# Patient Record
Sex: FEMALE | Race: BLACK OR AFRICAN AMERICAN | Employment: FULL TIME | URBAN - METROPOLITAN AREA
[De-identification: names, ages, dates, MRNs, and addresses within clinical notes are randomized per-mention and may not be internally consistent; named-entity substitution may affect disease eponyms.]

---

## 2017-02-09 RX ORDER — AMLODIPINE BESYLATE 10 MG/1
TABLET ORAL
Qty: 90 TAB | Refills: 0 | Status: SHIPPED | OUTPATIENT
Start: 2017-02-09 | End: 2017-05-08 | Stop reason: SDUPTHER

## 2017-02-22 RX ORDER — SCOLOPAMINE TRANSDERMAL SYSTEM 1 MG/1
1.5 PATCH, EXTENDED RELEASE TRANSDERMAL
Qty: 4 PATCH | Refills: 0 | Status: SHIPPED | OUTPATIENT
Start: 2017-02-22 | End: 2017-03-16 | Stop reason: ALTCHOICE

## 2017-02-22 NOTE — TELEPHONE ENCOUNTER
Patient calling to request a refill for her anti nausea medication [facility]. She will be leaving for a cruise on Sunday. Her contact # is [phone].

## 2017-03-16 ENCOUNTER — OFFICE VISIT (OUTPATIENT)
Dept: FAMILY MEDICINE CLINIC | Age: 57
End: 2017-03-16

## 2017-03-16 VITALS
RESPIRATION RATE: 16 BRPM | DIASTOLIC BLOOD PRESSURE: 81 MMHG | OXYGEN SATURATION: 97 % | TEMPERATURE: 98 F | SYSTOLIC BLOOD PRESSURE: 136 MMHG | BODY MASS INDEX: 31.64 KG/M2 | HEART RATE: 79 BPM | HEIGHT: 68 IN | WEIGHT: 208.8 LBS

## 2017-03-16 DIAGNOSIS — R29.898 TMJ CLICK: ICD-10-CM

## 2017-03-16 DIAGNOSIS — M26.629 TMJ PAIN DYSFUNCTION SYNDROME: Primary | ICD-10-CM

## 2017-03-16 RX ORDER — MECLIZINE HYDROCHLORIDE 25 MG/1
TABLET ORAL
Refills: 0 | COMMUNITY
Start: 2017-01-15 | End: 2020-09-28

## 2017-03-16 NOTE — PROGRESS NOTES
Chief Complaint   Patient presents with    Ear Pain     Feels stuffy with minimal pain started in Jan. 2017. Saw ENT 1/17 checked ears. Told to be on a soft diet for a week. 3/13/17 ate a cucumber and ear bothered her again. 3/14/17 saw the dentist and not sure if TMJ related.  Follow-up     [facility]. Had ears checked. Had a problem with dizziness. 1. Have you been to the ER, urgent care clinic since your last visit? Hospitalized since your last visit? Yes When: 1/15/17 [facility]: [facility] Reason for visit: Dizziness    2. Have you seen or consulted any other health care providers outside of the [address] [place] [address] since your last visit? Include any pap smears or colon screening. No       The patient was counseled on the dangers of tobacco use, and Patient is a non smoker. Reviewed strategies to maximize success, including Continue not to smoke. I have reviewed Health Maintenance with the patient and updated.   Advance Care Planning information reviewed and given to the patient at a previous vist.

## 2017-03-16 NOTE — MR AVS SNAPSHOT
Visit Information Date & Time Provider Department Dept. Phone Encounter #  
 3/16/2017  8:45 AM Douglas Dela Cruz MD [facility] [phone] [id number] Follow-up Instructions Return if symptoms worsen or fail to improve, for Has CHP scheduled. Follow-up and Disposition History Your Appointments 3/29/2017  8:15 AM  
LAB with LAB [facility] Mary Ann Aiken ([facility] [place]) Appt Note: Dr. Mireya Burton Fasting [address] [facility] [address]  
  
   
 [address]  
  
    
 4/5/2017  3:00 PM  
COMPLETE PHYSICAL with MD Mary Ann [facility]) Appt Note: Chp, Results [address] [facility] [address]  
[phone]  
  
   
 [address] [facility] [address] [facility] [address] [facility] Upcoming Health Maintenance Date Due DTaP/Tdap/Td series (1 - Tdap) 7/11/2018* BREAST CANCER SCRN MAMMOGRAM 8/1/2017 PAP AKA CERVICAL CYTOLOGY 7/9/2018 COLONOSCOPY 11/3/2021 *Topic was postponed. The date shown is not the original due date. Allergies as of 3/16/2017  Review Complete On: 3/16/2017 By: Douglas Dela Cruz MD  
  
 Severity Noted Reaction Type Reactions Contrast Agent [Iodine]  09/18/2009    Other (comments) Increased heart rate Current Immunizations  Reviewed on 9/15/2011 Name Date Influenza Vaccine Split 10/18/2010 TD Vaccine 7/11/2008, 10/1/1997 Not reviewed this visit You Were Diagnosed With   
  
 Codes Comments TMJ pain dysfunction syndrome    -  Primary ICD-10-CM: H53.520 ICD-9-CM: 524.69 TMJ click     JKG-10-VO: F65.115 ICD-9-CM: 524.64 Vitals BP Pulse Temp Resp Height(growth percentile) Weight(growth percentile) 136/81 (BP 1 Location: Left arm, BP Patient Position: Sitting) 79 98 °F (36.7 °C) (Oral) 16 5' 8.11\" (1.73 m) 208 lb 12.8 oz (94.7 kg) LMP SpO2 BMI OB Status Smoking Status 08/01/2016 97% 31.65 kg/m2 Premenopausal Never Smoker Vitals History BMI and BSA Data Body Mass Index Body Surface Area  
 31.65 kg/m 2 2.13 m 2 Preferred Pharmacy Pharmacy Name Phone [phone] [phone] Your Updated Medication List  
  
   
This list is accurate as of: 3/16/17  9:26 AM.  Always use your most recent med list. amLODIPine 10 mg tablet Commonly known as:  Lennis Eagles TAKE 1 TABLET DAILY  
  
 meclizine 25 mg tablet Commonly known as:  ANTIVERT  
TAKE 1 TABLET BY MOUTH 4 TIMES A DAY AS NEEDED FOR DIZZINESS NexIUM 20 mg capsule Generic drug:  esomeprazole Take  by mouth daily. VITAMIN D3 2,000 unit Tab Generic drug:  cholecalciferol (vitamin D3) Take 5,000 Units by mouth daily. Follow-up Instructions Return if symptoms worsen or fail to improve, for Has CHP scheduled. Introducing [facility]! Dear Olive Perez: Thank you for requesting a PurePhoto account. Our records indicate that you already have an active PurePhoto account. You can access your account anytime at [email or web address] Did you know that you can access your hospital and ER discharge instructions at any time in PurePhoto? You can also review all of your test results from your hospital stay or ER visit. Additional Information If you have questions, please visit the Frequently Asked Questions section of the PurePhoto website at [email or web address] Remember, PurePhoto is NOT to be used for urgent needs. For medical emergencies, dial 911. Now available from your iPhone and Android! Please provide this summary of care documentation to your next provider. Your primary care clinician is listed as [phone]  If you have any questions after today's visit, please call [phone].

## 2017-03-16 NOTE — PROGRESS NOTES
Nurse history read and confirmed by patient. Visit Vitals    /81 (BP 1 Location: Left arm, BP Patient Position: Sitting)    Pulse 79    Temp 98 °F (36.7 °C) (Oral)    Resp 16    Ht 5' 8.11\" (1.73 m)    Wt 208 lb 12.8 oz (94.7 kg)    LMP 08/01/2016    SpO2 97%    BMI 31.65 kg/m2       Patient alert and cooperative. Right canal, TM clear. Right TMJ with prominent click, though nontender to palpation. Assessment:  1. Right ear symptoms, probable TMJ related. Plan:  1. Go back on soft diet, use hot compresses, Advil, Aleve. 2. Follow up with the dentist for mouth guard. 3. Has CHP, labs scheduled. 4. Recheck here otherwise prn.

## 2017-03-29 ENCOUNTER — LAB ONLY (OUTPATIENT)
Dept: FAMILY MEDICINE CLINIC | Age: 57
End: 2017-03-29

## 2017-03-29 DIAGNOSIS — E78.00 PURE HYPERCHOLESTEROLEMIA: Primary | ICD-10-CM

## 2017-03-29 DIAGNOSIS — E55.9 VITAMIN D DEFICIENCY: ICD-10-CM

## 2017-03-29 DIAGNOSIS — I10 ESSENTIAL HYPERTENSION: ICD-10-CM

## 2017-03-29 NOTE — LETTER
3/30/2017 4:17 PM 
 
Ms. Karie Marks [address] [place] [address] Dear Laura Tariq: 
 
Please find your most recent results below. Resulted Orders VITAMIN D, 25 HYDROXY Result Value Ref Range VITAMIN D, 25-HYDROXY 35.0 30.0 - 100.0 ng/mL Comment:  
   Vitamin D deficiency has been defined by the [address] [place] [address] practice guideline as a 
level of serum 25-OH vitamin D less than 20 ng/mL (1,2). The Endocrine Society went on to further define vitamin D 
insufficiency as a level between 21 and 29 ng/mL (2). 1. IOM ([place] of Medicine). 2010. Dietary reference 
   intakes for calcium and D. [facility]: The 
   SafetyWeb. 2. Dora MF, Owen VERAS, Cody CHAVEZ, et al. 
   Evaluation, treatment, and prevention of vitamin D 
   deficiency: an Endocrine Society clinical practice 
   guideline. JCEM. 2011 Jul; 96(7):1911-30. Narrative Performed at:  [address]  [id number] : Pratibha Fong MD, Phone:  [phone] CBC WITH AUTOMATED DIFF Result Value Ref Range WBC 4.5 3.4 - 10.8 x10E3/uL  
 RBC 5.05 3.77 - 5.28 x10E6/uL HGB 13.5 11.1 - 15.9 g/dL HCT 41.8 34.0 - 46.6 % MCV 83 79 - 97 fL  
 MCH 26.7 26.6 - 33.0 pg  
 MCHC 32.3 31.5 - 35.7 g/dL  
 RDW 13.8 12.3 - 15.4 % PLATELET 099 274 - 031 x10E3/uL NEUTROPHILS 53 % Lymphocytes 33 % MONOCYTES 9 % EOSINOPHILS 5 % BASOPHILS 0 %  
 ABS. NEUTROPHILS 2.4 1.4 - 7.0 x10E3/uL Abs Lymphocytes 1.5 0.7 - 3.1 x10E3/uL  
 ABS. MONOCYTES 0.4 0.1 - 0.9 x10E3/uL  
 ABS. EOSINOPHILS 0.2 0.0 - 0.4 x10E3/uL  
 ABS. BASOPHILS 0.0 0.0 - 0.2 x10E3/uL IMMATURE GRANULOCYTES 0 %  
 ABS. IMM. GRANS. 0.0 0.0 - 0.1 x10E3/uL Narrative Performed at:  [address]  [id number] : Pratibha Fong MD, Phone:  [phone] URINALYSIS W/ RFLX MICROSCOPIC Result Value Ref Range Specific Gravity 1.026 1.005 - 1.030  
 pH (UA) 6.0 5.0 - 7.5 Color Yellow Yellow Appearance Cloudy (A) Clear Leukocyte Esterase Negative Negative Protein Trace Negative/Trace Glucose Negative Negative Ketone Negative Negative Blood Negative Negative Bilirubin Negative Negative Urobilinogen 0.2 0.2 - 1.0 mg/dL Nitrites Negative Negative Microscopic Examination Comment Comment:  
   Microscopic not indicated and not performed. Narrative Performed at:  [address]  [id number] : Sherlyn Jones MD, Phone:  [phone] TSH 3RD GENERATION Result Value Ref Range TSH 1.380 0.450 - 4.500 uIU/mL Narrative Performed at:  [address]  [id number] : Sherlyn Jones MD, Phone:  [phone] METABOLIC PANEL, COMPREHENSIVE Result Value Ref Range Glucose 89 65 - 99 mg/dL BUN 11 6 - 24 mg/dL Creatinine 0.83 0.57 - 1.00 mg/dL GFR est non-AA 79 >59 mL/min/1.73 GFR est AA 91 >59 mL/min/1.73  
 BUN/Creatinine ratio 13 9 - 23 Comment: **Effective April 3, 2017 BUN/Creatinine Ratio** 
  reference interval will be changing to: Age                  Male          Female 
     0 days   -  7 days          9 - 25         9 - 26 
     8 days   - 30 days          8 - 28        10 - 33 
     1 month  -  6 months       11 - 62        11 - 47 
     7 months -  1 year         20 - 70        20 - 70 
     2 years  -  5 years        23 - 52        20 - 52 
     6 years  - 15 years        15 - 29        12 - 28 
    13 years  - 16 years        8 - 25        10 - 25 
    18 years  - 61 years         5 - 21         9 - 21 
               >59 years        10 - 25        15 - 29 Sodium 141 134 - 144 mmol/L Potassium 4.3 3.5 - 5.2 mmol/L  Chloride 102 96 - 106 mmol/L  
 CO2 24 18 - 29 mmol/L Calcium 9.6 8.7 - 10.2 mg/dL Protein, total 7.0 6.0 - 8.5 g/dL Albumin 4.3 3.5 - 5.5 g/dL GLOBULIN, TOTAL 2.7 1.5 - 4.5 g/dL A-G Ratio 1.6 1.2 - 2.2 Comment: **Please note reference interval change** Bilirubin, total 0.4 0.0 - 1.2 mg/dL Alk. phosphatase 73 39 - 117 IU/L  
 AST (SGOT) 15 0 - 40 IU/L  
 ALT (SGPT) 8 0 - 32 IU/L Narrative Performed at:  [address]  [id number] : Sudha Arreguin MD, Phone:  [phone] LIPID PANEL Result Value Ref Range Cholesterol, total 261 (H) 100 - 199 mg/dL Triglyceride 62 0 - 149 mg/dL HDL Cholesterol 58 >39 mg/dL VLDL, calculated 12 5 - 40 mg/dL LDL, calculated 191 (H) 0 - 99 mg/dL Narrative Performed at:  [address]  [id number] : Sudha Arreguin MD, Phone:  [phone] CVD REPORT Result Value Ref Range INTERPRETATION Note Comment:  
   Supplement report is available. Narrative Performed at:  [address] [place] [address] [address] [place] [address]  [id number] : Nette Forrest PhD, Phone:  [phone] Sincerely, Lab [facility]

## 2017-03-30 LAB
25(OH)D3+25(OH)D2 SERPL-MCNC: 35 NG/ML (ref 30–100)
ALBUMIN SERPL-MCNC: 4.3 G/DL (ref 3.5–5.5)
ALBUMIN/GLOB SERPL: 1.6 {RATIO} (ref 1.2–2.2)
ALP SERPL-CCNC: 73 IU/L (ref 39–117)
ALT SERPL-CCNC: 8 IU/L (ref 0–32)
APPEARANCE UR: ABNORMAL
AST SERPL-CCNC: 15 IU/L (ref 0–40)
BASOPHILS # BLD AUTO: 0 X10E3/UL (ref 0–0.2)
BASOPHILS NFR BLD AUTO: 0 %
BILIRUB SERPL-MCNC: 0.4 MG/DL (ref 0–1.2)
BILIRUB UR QL STRIP: NEGATIVE
BUN SERPL-MCNC: 11 MG/DL (ref 6–24)
BUN/CREAT SERPL: 13 (ref 9–23)
CALCIUM SERPL-MCNC: 9.6 MG/DL (ref 8.7–10.2)
CHLORIDE SERPL-SCNC: 102 MMOL/L (ref 96–106)
CHOLEST SERPL-MCNC: 261 MG/DL (ref 100–199)
CO2 SERPL-SCNC: 24 MMOL/L (ref 18–29)
COLOR UR: YELLOW
CREAT SERPL-MCNC: 0.83 MG/DL (ref 0.57–1)
EOSINOPHIL # BLD AUTO: 0.2 X10E3/UL (ref 0–0.4)
EOSINOPHIL NFR BLD AUTO: 5 %
ERYTHROCYTE [DISTWIDTH] IN BLOOD BY AUTOMATED COUNT: 13.8 % (ref 12.3–15.4)
GLOBULIN SER CALC-MCNC: 2.7 G/DL (ref 1.5–4.5)
GLUCOSE SERPL-MCNC: 89 MG/DL (ref 65–99)
GLUCOSE UR QL: NEGATIVE
HCT VFR BLD AUTO: 41.8 % (ref 34–46.6)
HDLC SERPL-MCNC: 58 MG/DL
HGB BLD-MCNC: 13.5 G/DL (ref 11.1–15.9)
HGB UR QL STRIP: NEGATIVE
IMM GRANULOCYTES # BLD: 0 X10E3/UL (ref 0–0.1)
IMM GRANULOCYTES NFR BLD: 0 %
INTERPRETATION, 910389: NORMAL
KETONES UR QL STRIP: NEGATIVE
LDLC SERPL CALC-MCNC: 191 MG/DL (ref 0–99)
LEUKOCYTE ESTERASE UR QL STRIP: NEGATIVE
LYMPHOCYTES # BLD AUTO: 1.5 X10E3/UL (ref 0.7–3.1)
LYMPHOCYTES NFR BLD AUTO: 33 %
MCH RBC QN AUTO: 26.7 PG (ref 26.6–33)
MCHC RBC AUTO-ENTMCNC: 32.3 G/DL (ref 31.5–35.7)
MCV RBC AUTO: 83 FL (ref 79–97)
MICRO URNS: ABNORMAL
MONOCYTES # BLD AUTO: 0.4 X10E3/UL (ref 0.1–0.9)
MONOCYTES NFR BLD AUTO: 9 %
NEUTROPHILS # BLD AUTO: 2.4 X10E3/UL (ref 1.4–7)
NEUTROPHILS NFR BLD AUTO: 53 %
NITRITE UR QL STRIP: NEGATIVE
PH UR STRIP: 6 [PH] (ref 5–7.5)
PLATELET # BLD AUTO: 215 X10E3/UL (ref 150–379)
POTASSIUM SERPL-SCNC: 4.3 MMOL/L (ref 3.5–5.2)
PROT SERPL-MCNC: 7 G/DL (ref 6–8.5)
PROT UR QL STRIP: ABNORMAL
RBC # BLD AUTO: 5.05 X10E6/UL (ref 3.77–5.28)
SODIUM SERPL-SCNC: 141 MMOL/L (ref 134–144)
SP GR UR: 1.03 (ref 1–1.03)
TRIGL SERPL-MCNC: 62 MG/DL (ref 0–149)
TSH SERPL DL<=0.005 MIU/L-ACNC: 1.38 UIU/ML (ref 0.45–4.5)
UROBILINOGEN UR STRIP-MCNC: 0.2 MG/DL (ref 0.2–1)
VLDLC SERPL CALC-MCNC: 12 MG/DL (ref 5–40)
WBC # BLD AUTO: 4.5 X10E3/UL (ref 3.4–10.8)

## 2017-03-30 NOTE — PROGRESS NOTES
Lipids worse. Prev 0 score heart scan 2014. Still recommend low dose statin to get LDL at least < 160.   Rest labs all O.K.

## 2017-04-05 ENCOUNTER — OFFICE VISIT (OUTPATIENT)
Dept: FAMILY MEDICINE CLINIC | Age: 57
End: 2017-04-05

## 2017-04-05 VITALS
HEIGHT: 68 IN | SYSTOLIC BLOOD PRESSURE: 140 MMHG | HEART RATE: 89 BPM | TEMPERATURE: 97.7 F | WEIGHT: 207.7 LBS | OXYGEN SATURATION: 98 % | DIASTOLIC BLOOD PRESSURE: 88 MMHG | BODY MASS INDEX: 31.48 KG/M2 | RESPIRATION RATE: 16 BRPM

## 2017-04-05 DIAGNOSIS — K63.5 POLYP OF COLON, UNSPECIFIED PART OF COLON, UNSPECIFIED TYPE: ICD-10-CM

## 2017-04-05 DIAGNOSIS — K21.9 GASTROESOPHAGEAL REFLUX DISEASE WITHOUT ESOPHAGITIS: ICD-10-CM

## 2017-04-05 DIAGNOSIS — Z83.71 FH: COLONIC POLYPS: ICD-10-CM

## 2017-04-05 DIAGNOSIS — E78.00 PURE HYPERCHOLESTEROLEMIA: ICD-10-CM

## 2017-04-05 DIAGNOSIS — I10 ESSENTIAL HYPERTENSION: ICD-10-CM

## 2017-04-05 DIAGNOSIS — E55.9 VITAMIN D DEFICIENCY: ICD-10-CM

## 2017-04-05 DIAGNOSIS — Z00.00 PHYSICAL EXAM: Primary | ICD-10-CM

## 2017-04-05 RX ORDER — NAPROXEN SODIUM 550 MG/1
TABLET ORAL
COMMUNITY
Start: 2017-04-04 | End: 2017-04-05 | Stop reason: ALTCHOICE

## 2017-04-05 RX ORDER — LANOLIN ALCOHOL/MO/W.PET/CERES
CREAM (GRAM) TOPICAL
COMMUNITY
End: 2018-07-26 | Stop reason: ALTCHOICE

## 2017-04-05 RX ORDER — PRAVASTATIN SODIUM 20 MG/1
20 TABLET ORAL
Qty: 30 TAB | Refills: 0 | Status: SHIPPED | OUTPATIENT
Start: 2017-04-05 | End: 2018-07-26 | Stop reason: ALTCHOICE

## 2017-04-05 NOTE — PROGRESS NOTES
HISTORY OF PRESENT ILLNESS  Nino Fernando is a 65 y.o. female. HPI   Here for [facility]. TMJ improving. Getting mouth guard and plans on doing PT. Willing to start statin therapy. FH neg for heart disease. Eye doctor past yr. Dentist 2 x annually. Colonoscopy '16. Repeat 5 yr for polyps. Gyn and mammo UTD. No signif hx past yr. Patient Active Problem List   Diagnosis Code    Pure hypercholesterolemia E78.00    Essential hypertension I10    GERD (gastroesophageal reflux disease) K21.9    Vitamin D deficiency E55.9    FH: colonic polyps Z83.71     Current Outpatient Prescriptions   Medication Sig Dispense Refill    omega 3-dha-epa-fish oil (FISH OIL) 100-160-1,000 mg cap Take 5 mL by mouth daily.  OTHER Meratrim 800 mg  Taking 1 daily.  ferrous sulfate (IRON) 325 mg (65 mg iron) tablet Take  by mouth Daily (before breakfast).  meclizine (ANTIVERT) 25 mg tablet TAKE 1 TABLET BY MOUTH 4 TIMES A DAY AS NEEDED FOR DIZZINESS  0    amLODIPine (NORVASC) 10 mg tablet TAKE 1 TABLET DAILY 90 Tab 0    esomeprazole (NEXIUM) 20 mg capsule Take  by mouth daily.  cholecalciferol, vitamin D3, (VITAMIN D3) 2,000 unit tab Take 5,000 Units by mouth daily.        Allergies   Allergen Reactions    Contrast Agent [Iodine] Other (comments)     Increased heart rate     Past Medical History:   Diagnosis Date    Advance directive discussed with patient 03/15/2016    Anemia NEC     shwetha romero   2/11/16 f/u note    Atypical chest pain     dr Stevo Faria [phone]    Back pain 03/2016    Colon polyp 11/03/2016    bx pending but report says 5 yr repeat    Dysphagia 2/24/14    notes from [address] EGD planned    Elevated lipids     dr Austyn Valdez [phone]    Eustachian tube dysfunction 01/10/2017    notes from ENT giodano    Fibrocystic breast disease     Fractured tarsal bone 10/2013    pt first    GERD (gastroesophageal reflux disease)     Head ache 2/9/15    tension headache per [facility] note    Headache(784.0) 11/2009    dr Jackie Velásquez Hypercholesterolemia     Hypertension     Incontinence of urine     Labyrinthitis 01/13/2017    PF Norberto note    Left knee pain 7/3013    6/4/14 [facility]    PPD positive, treated     INH but not sure length of tx    Screening for viral disease 12/3/15    titers neg for Hep A-B and C thru GYN    Tension type headache     Viral gastroenteritis [id number]    kilo notes     Past Surgical History:   Procedure Laterality Date    CT HEART W/O CONT WITH CALCIUM  3/2006    score 4.81    EGD  3/2002    dr Stevens for Barretts    HX COLONOSCOPY  11/03/2016    5 yr recall-1 polyp removed    HX ORTHOPAEDIC  9/2010    torn meniscus dr aguero    HX ORTHOPAEDIC  1/18/10    ganglion cyst dr Breanna Linares    [address]  3/17/14    repeat EGD Dr Diaz Selby FLX DX W/COLLJ [address] PFRMD  10/3/2011          Family History   Problem Relation Age of Onset    Cancer Father      pancreatic    Cancer Maternal Aunt      Social History   Substance Use Topics    Smoking status: Never Smoker    Smokeless tobacco: Never Used    Alcohol use Yes      Comment: wine occassional      Lab Results  Component Value Date/Time   WBC 4.5 03/29/2017 08:20 AM   HGB 13.5 03/29/2017 08:20 AM   HCT 41.8 03/29/2017 08:20 AM   PLATELET 140 66/33/2266 08:20 AM   MCV 83 03/29/2017 08:20 AM       Lab Results  Component Value Date/Time   Glucose 89 03/29/2017 08:20 AM   LDL, calculated 191 03/29/2017 08:20 AM   Creatinine 0.83 03/29/2017 08:20 AM      Lab Results  Component Value Date/Time   Cholesterol, total 261 03/29/2017 08:20 AM   HDL Cholesterol 58 03/29/2017 08:20 AM   LDL, calculated 191 03/29/2017 08:20 AM   Triglyceride 62 03/29/2017 08:20 AM   CHOL/HDL Ratio 3.7 11/11/2010 10:33 AM       Lab Results  Component Value Date/Time   ALT (SGPT) 8 03/29/2017 08:20 AM   AST (SGOT) 15 03/29/2017 08:20 AM   Alk.  phosphatase 73 03/29/2017 08:20 AM   Bilirubin, direct 0.10 11/22/2011 08:45 AM   Bilirubin, total 0.4 03/29/2017 08:20 AM       Lab Results  Component Value Date/Time   GFR est AA 91 03/29/2017 08:20 AM   GFR est non-AA 79 03/29/2017 08:20 AM   Creatinine 0.83 03/29/2017 08:20 AM   BUN 11 03/29/2017 08:20 AM   Sodium 141 03/29/2017 08:20 AM   Potassium 4.3 03/29/2017 08:20 AM   Chloride 102 03/29/2017 08:20 AM   CO2 24 03/29/2017 08:20 AM      Lab Results  Component Value Date/Time   TSH 1.380 03/29/2017 08:20 AM      Lab Results   Component Value Date/Time    Glucose 89 03/29/2017 08:20 AM         Review of Systems   Constitutional: Positive for weight loss. HENT: Positive for congestion, ear pain and hearing loss. Eyes: Negative. Respiratory: Negative. Cardiovascular: Negative. Gastrointestinal: Positive for heartburn. Genitourinary: Negative. Musculoskeletal: Positive for back pain and joint pain. Skin: Negative. Neurological: Positive for dizziness and tingling. Endo/Heme/Allergies: Positive for environmental allergies. Psychiatric/Behavioral: The patient has insomnia. Physical Exam   Vitals:    04/05/17 1513 04/05/17 1526   BP: 135/89 140/88   Pulse: 89    Resp: 16    Temp: 97.7 °F (36.5 °C)    TempSrc: Oral    SpO2: 98%    Weight: 207 lb 11.2 oz (94.2 kg)    Height: 5' 8\" (1.727 m)        General    alert, cooperative, no distress, appears stated age   Head    Normocephalic, without obvious abnormality, atraumatic   Eyes    conjunctivae/corneas clear. PERRL. Fundi benign   Ears    Canals and TMs clear   Nose Passages patent. Mucosa normal. No drainage or sinus tenderness. Throat Lips, mucosa, and tongue normal. Teeth and gums normal.  Post pharynx neg. Neck supple, nontender, no adenopathy, thyroid: not enlarged, no masses/nodules, no carotid bruits   Back     symmetric, no curvature. FROM.  No CVA tenderness   Lungs     clear to auscultation bilaterally   Breasts    Declined   Heart    Regular rate and rhythm, with no murmur, click, rub or gallop   Abdomen   Soft, non-tender. Bowel sounds normal. No masses,  No organomegaly   Genitalia and Rectal  Deferred. Per GYN. Extremities   extremities normal, atraumatic, no cyanosis or edema   Pulses   2+ and symmetric except absent left radial from prev surgery. Skin No rashes or lesions       Neurologic Romberg neg, nml heel, toe and Tandem gait. DTRs 2+ symmetric except absent left patella. ASSESSMENT and PLAN    ICD-10-CM ICD-9-CM    1. Physical exam Z00.00 V70.9    2. Polyp of colon, unspecified part of colon, unspecified type K63.5 211.3    3. Pure hypercholesterolemia E78.00 272.0    4. Essential hypertension I10 401.9    5. Gastroesophageal reflux disease without esophagitis K21.9 530.81    6. Vitamin D deficiency E55.9 268.9    7. FH: colonic polyps Z83.71 V18.51      Follow-up Disposition:  Return in about 1 year (around 4/5/2018) for annual CHP and fasting labs.

## 2017-04-05 NOTE — PROGRESS NOTES
Chief Complaint   Patient presents with    Complete Physical     Patient has questions about Oil of Oregano,  Olive leaf extract and Astralagus. 1. Have you been to the ER, urgent care clinic since your last visit? Hospitalized since your last visit? No    2. Have you seen or consulted any other health care providers outside of the [address] [place] [address] since your last visit? Include any pap smears or colon screening. No     The patient was counseled on the dangers of tobacco use, and Patient is a non smoker. .  Reviewed strategies to maximize success, including Continue not to smoke. I have reviewed Health Maintenance with the patient and updated. Advance Care Planning information reviewed and given to the patient at a previous visit. Complete Physical Exam Female  Pre-Visit Questions:    1. Do you follow a low fat or low salt diet ? y  2. Do you follow an exercise program? y  3. Have you had your tetanus booster in the last 10 years? y  3. Have you ever had a Pneumonia vaccine? n  5. Do you smoke? n  6. Do you consider yourself overweight? y  7. Do you perform Breast self exam?n  8. Is there a family history of CAD< age 48? n  5. Is there a family history of Cancer? y  8. Do you have any Cancer risks? y  Mother's aunt had Breast cancer. 11.  Have you had a colonoscopy? y  15. Have you been to your eye doctor past year?   y  15. Have you been to your dentist in the last 6 months?  y  15. Have you had your flu shot for this season?  n  13. Have you had a Pap smear in the last 3 years?y  16. Have you had your annual mammogram?y  17.   Have you had a bone density scan(DEXA)?n

## 2017-04-17 ENCOUNTER — OFFICE VISIT (OUTPATIENT)
Dept: CARDIOLOGY CLINIC | Age: 57
End: 2017-04-17

## 2017-04-17 VITALS
HEART RATE: 75 BPM | RESPIRATION RATE: 18 BRPM | HEIGHT: 68 IN | BODY MASS INDEX: 31.04 KG/M2 | OXYGEN SATURATION: 97 % | SYSTOLIC BLOOD PRESSURE: 120 MMHG | DIASTOLIC BLOOD PRESSURE: 80 MMHG | WEIGHT: 204.8 LBS

## 2017-04-17 DIAGNOSIS — I10 ESSENTIAL HYPERTENSION: ICD-10-CM

## 2017-04-17 DIAGNOSIS — E78.2 MIXED HYPERLIPIDEMIA: Primary | ICD-10-CM

## 2017-04-17 DIAGNOSIS — R93.1 AGATSTON CAC SCORE, <100: ICD-10-CM

## 2017-04-17 RX ORDER — CETIRIZINE HYDROCHLORIDE 5 MG/1
TABLET ORAL
COMMUNITY
End: 2018-07-26 | Stop reason: ALTCHOICE

## 2017-04-17 RX ORDER — BENZOCAINE .13; .15; .5; 2 G/100G; G/100G; G/100G; G/100G
GEL ORAL
COMMUNITY
End: 2018-07-26 | Stop reason: ALTCHOICE

## 2017-04-17 NOTE — MR AVS SNAPSHOT
Visit Information Date & Time Provider Department Dept. Phone Encounter #  
 4/17/2017  3:00 PM Aris Burger, [address] [facility] [id number] Upcoming Health Maintenance Date Due  
 BREAST CANCER SCRN MAMMOGRAM 8/1/2017 DTaP/Tdap/Td series (1 - Tdap) 7/11/2018* PAP AKA CERVICAL CYTOLOGY 7/9/2018 COLONOSCOPY 11/3/2021 *Topic was postponed. The date shown is not the original due date. Allergies as of 4/17/2017  Review Complete On: 4/17/2017 By: Arsi Burger MD  
  
 Severity Noted Reaction Type Reactions Contrast Agent [Iodine]  09/18/2009    Other (comments) Increased heart rate Current Immunizations  Reviewed on 9/15/2011 Name Date Influenza Vaccine Split 10/18/2010 TD Vaccine 7/11/2008, 10/1/1997 Not reviewed this visit You Were Diagnosed With   
  
 Codes Comments Mixed hyperlipidemia    -  Primary ICD-10-CM: M93.9 ICD-9-CM: 272.2 Essential hypertension     ICD-10-CM: I10 
ICD-9-CM: 401.9 [place] CAC score, <100     ICD-10-CM: R93.1 ICD-9-CM: 793.2 Vitals BP Pulse Resp Height(growth percentile) Weight(growth percentile) SpO2  
 120/80 (BP 1 Location: Right arm, BP Patient Position: Sitting) 75 18 5' 8\" (1.727 m) 204 lb 12.8 oz (92.9 kg) 97% BMI OB Status Smoking Status 31.14 kg/m2 Premenopausal Never Smoker Vitals History BMI and BSA Data Body Mass Index Body Surface Area  
 31.14 kg/m 2 2.11 m 2 Preferred Pharmacy Pharmacy Name Phone [phone] [facility] [phone] Your Updated Medication List  
  
   
This list is accurate as of: 4/17/17  3:48 PM.  Always use your most recent med list. amLODIPine 10 mg tablet Commonly known as:  Leana Fahad TAKE 1 TABLET DAILY  
  
 cetirizine 5 mg tablet Commonly known as:  ZYRTEC Take  by mouth. FISH -160-1,000 mg Cap Generic drug:  omega 3-dha-epa-fish oil Take 5 mL by mouth daily. Iron 325 mg (65 mg iron) tablet Generic drug:  ferrous sulfate Take  by mouth Daily (before breakfast). meclizine 25 mg tablet Commonly known as:  ANTIVERT  
TAKE 1 TABLET BY MOUTH 4 TIMES A DAY AS NEEDED FOR DIZZINESS NexIUM 20 mg capsule Generic drug:  esomeprazole Take  by mouth daily. OTHER Meratrim 800 mg  Taking 1 daily. pravastatin 20 mg tablet Commonly known as:  PRAVACHOL Take 1 Tab by mouth nightly. Indications: primary prevention of coronary heart disease RHINOCORT AQUA 32 mcg/actuation nasal spray Generic drug:  budesonide VITAMIN D3 2,000 unit Tab Generic drug:  cholecalciferol (vitamin D3) Take 5,000 Units by mouth daily. We Performed the Following AMB POC EKG ROUTINE W/ 12 LEADS, INTER & REP [85795 CPT(R)] Introducing [facility]! Dear Joshua So: Thank you for requesting a Mavent account. Our records indicate that you already have an active Mavent account. You can access your account anytime at [email or web address]. [email or web address] in One Medical Did you know that you can access your hospital and ER discharge instructions at any time in Mavent? You can also review all of your test results from your hospital stay or ER visit. Additional Information If you have questions, please visit the Frequently Asked Questions section of the Mavent website at [email or web address] Remember, Mavent is NOT to be used for urgent needs. For medical emergencies, dial 911. Now available from your iPhone and Android! Please provide this summary of care documentation to your next provider. Your primary care clinician is listed as [phone] If you have any questions after today's visit, please call [phone].

## 2017-04-17 NOTE — PROGRESS NOTES
[address], [address] [facility]  [phone]     Subjective:      Jose Miranda is a 65 y.o. female is here for routine f/u. The patient denies chest pain/ shortness of breath, orthopnea, PND, LE edema, palpitations, syncope, or presyncope.        Patient Active Problem List    Diagnosis Date Noted    Agatston CAC score, <100 04/17/2017    FH: colonic polyps 03/15/2016    Vitamin D deficiency 09/13/2011    Mixed hyperlipidemia 09/18/2009    Essential hypertension 09/18/2009    GERD (gastroesophageal reflux disease) 09/18/2009      Leighann Muller MD  Past Medical History:   Diagnosis Date    Advance directive discussed with patient 03/15/2016    Anemia NEC     shwetha philipan   2/11/16 f/u note    Atypical chest pain     dr Alicia Rucker [phone]    Back pain 03/2016    Colon polyp 11/03/2016    bx pending but report says 5 yr repeat    Dysphagia 2/24/14    notes from [facility] EGD planned    Elevated lipids     dr Yamini Jensen [phone]    Eustachian tube dysfunction 01/10/2017    notes from ENT giodano    Fibrocystic breast disease     Fractured tarsal bone 10/2013    pt first    GERD (gastroesophageal reflux disease)     Head ache 2/9/15    tension headache per Pt First note    Headache 11/2009    dr [facility] [place] Hypercholesterolemia     Hypertension     Incontinence of urine     Labyrinthitis 01/13/2017    [facility]to note    Left knee pain 7/3013    6/4/14 [facility]    PPD positive, treated     INH but not sure length of tx    Screening for viral disease 12/3/15    titers neg for Hep A-B and C thru GYN    Tension type headache     Viral gastroenteritis [id number]    kilo notes      Past Surgical History:   Procedure Laterality Date    CT HEART W/O CONT WITH CALCIUM  3/2006    score 4.81    EGD  3/2002    dr Raz Gutierrez for Barretts    HX COLONOSCOPY  11/03/2016    5 yr recall-1 polyp removed    HX ORTHOPAEDIC  9/2010 torn meniscus dr aguero    HX ORTHOPAEDIC  1/18/10    ganglion cyst dr Maggie Merida OTHER SURGICAL  3/17/14    repeat EGD Dr Farooq Joaquin FLX DX W/COLLJ [place] 1977 PFRMD  10/3/2011          Allergies   Allergen Reactions    Contrast Agent [Iodine] Other (comments)     Increased heart rate      Family History   Problem Relation Age of Onset    Cancer Father      pancreatic    Cancer Maternal Aunt       Social History     Social History    Marital status:      Spouse name: N/A    Number of children: N/A    Years of education: N/A     Occupational History    Not on file. Social History Main Topics    Smoking status: Never Smoker    Smokeless tobacco: Never Used    Alcohol use Yes      Comment: wine occassional    Drug use: No    Sexual activity: Not on file     Other Topics Concern    Not on file     Social History Narrative      Current Outpatient Prescriptions   Medication Sig    budesonide (RHINOCORT AQUA) 32 mcg/actuation nasal spray     cetirizine (ZYRTEC) 5 mg tablet Take  by mouth.  omega 3-dha-epa-fish oil (FISH OIL) 100-160-1,000 mg cap Take 5 mL by mouth daily.  OTHER Meratrim 800 mg  Taking 1 daily.  ferrous sulfate (IRON) 325 mg (65 mg iron) tablet Take  by mouth Daily (before breakfast).  pravastatin (PRAVACHOL) 20 mg tablet Take 1 Tab by mouth nightly. Indications: primary prevention of coronary heart disease    meclizine (ANTIVERT) 25 mg tablet TAKE 1 TABLET BY MOUTH 4 TIMES A DAY AS NEEDED FOR DIZZINESS    amLODIPine (NORVASC) 10 mg tablet TAKE 1 TABLET DAILY    esomeprazole (NEXIUM) 20 mg capsule Take  by mouth daily.  cholecalciferol, vitamin D3, (VITAMIN D3) 2,000 unit tab Take 5,000 Units by mouth daily. No current facility-administered medications for this visit.           Review of Symptoms:  11 systems reviewed, negative other than as stated in the HPI    Physical ExamPhysical Exam:    Vitals:    04/17/17 1502 04/17/17 1517   BP: 122/80 120/80   Pulse: 75    Resp: 18    SpO2: 97%    Weight: 204 lb 12.8 oz (92.9 kg)    Height: 5' 8\" (1.727 m)      Body mass index is 31.14 kg/(m^2). General PE   Gen:  NAD  Mental Status - Alert. General Appearance - Not in acute distress. Chest and Lung Exam   Inspection: Accessory muscles - No use of accessory muscles in breathing. Auscultation:   Breath sounds: - Normal.   Cardiovascular   Inspection: Jugular vein - Bilateral - Inspection Normal.   Palpation/Percussion:   Apical Impulse: - Normal.   Auscultation: Rhythm - Regular. Heart Sounds - S1 WNL and S2 WNL. No S3 or S4. Murmurs & Other Heart Sounds: Auscultation of the heart reveals - No Murmurs. Peripheral Vascular   Upper Extremity: Inspection - Bilateral - No Cyanotic nailbeds or Digital clubbing. Lower Extremity:   Palpation: Edema - Bilateral - No edema. Abdomen:   Soft, non-tender, bowel sounds are active.   Neuro: A&O times 3, CN and motor grossly WNL    Labs:   Lab Results   Component Value Date/Time    Cholesterol, total 261 03/29/2017 08:20 AM    Cholesterol, total 242 03/15/2016 10:36 AM    Cholesterol, total 222 08/21/2014 08:10 AM    Cholesterol, total 214 04/10/2013 09:35 AM    Cholesterol, total 227 11/22/2011 08:45 AM    HDL Cholesterol 58 03/29/2017 08:20 AM    HDL Cholesterol 66 03/15/2016 10:36 AM    HDL Cholesterol 69 08/21/2014 08:10 AM    HDL Cholesterol 74 04/10/2013 09:35 AM    HDL Cholesterol 76 11/22/2011 08:45 AM    LDL, calculated 191 03/29/2017 08:20 AM    LDL, calculated 166 03/15/2016 10:36 AM    LDL, calculated 144 08/21/2014 08:10 AM    LDL, calculated 133 04/10/2013 09:35 AM    LDL, calculated 141 11/22/2011 08:45 AM    Triglyceride 62 03/29/2017 08:20 AM    Triglyceride 49 03/15/2016 10:36 AM    Triglyceride 47 08/21/2014 08:10 AM    Triglyceride 35 04/10/2013 09:35 AM    Triglyceride 51 11/22/2011 08:45 AM    CHOL/HDL Ratio 3.7 11/11/2010 10:33 AM    CHOL/HDL Ratio 3.5 10/15/2009 07:53 AM     Lab Results Component Value Date/Time     03/08/2012 10:12 AM     Lab Results   Component Value Date/Time    Sodium 141 03/29/2017 08:20 AM    Potassium 4.3 03/29/2017 08:20 AM    Chloride 102 03/29/2017 08:20 AM    CO2 24 03/29/2017 08:20 AM    Anion gap 6 03/08/2012 04:45 AM    Glucose 89 03/29/2017 08:20 AM    BUN 11 03/29/2017 08:20 AM    Creatinine 0.83 03/29/2017 08:20 AM    BUN/Creatinine ratio 13 03/29/2017 08:20 AM    GFR est AA 91 03/29/2017 08:20 AM    GFR est non-AA 79 03/29/2017 08:20 AM    Calcium 9.6 03/29/2017 08:20 AM    Bilirubin, total 0.4 03/29/2017 08:20 AM    AST (SGOT) 15 03/29/2017 08:20 AM    Alk. phosphatase 73 03/29/2017 08:20 AM    Protein, total 7.0 03/29/2017 08:20 AM    Albumin 4.3 03/29/2017 08:20 AM    Globulin 3.8 03/08/2012 04:45 AM    A-G Ratio 1.6 03/29/2017 08:20 AM    ALT (SGPT) 8 03/29/2017 08:20 AM       EKG:  NSR     Assessment:      1. Mixed hyperlipidemia    2. Essential hypertension    3. Agatston CAC score, <100        Orders Placed This Encounter    AMB POC EKG ROUTINE W/ 12 LEADS, INTER & REP     Order Specific Question:   Reason for Exam:     Answer:   routine    budesonide (RHINOCORT AQUA) 32 mcg/actuation nasal spray    cetirizine (ZYRTEC) 5 mg tablet     Sig: Take  by mouth. Plan:     Doing well with no adverse cardiac symptoms. Lipids: Followed by Dr. Teresita Flores. Recently started on low-dose pravastatin as she was hesitant to start a high intensity cholesterol medication. Comforting is her history of a coronary calcium score of 0 in 2014. She had significantly decreased her exercise due to some low back discomfort. She now realizes the importance of regular exercise. Counseled on diet and exercise- eventual goal of 30-60 minutes 5-7 times a week as per AHA guidelines. Goal of 10% (20 lbs) weight loss for 6 months. HTN:  At goal.    Continue current care and f/u in 12 months.     Dali Cao MD

## 2017-05-24 ENCOUNTER — LAB ONLY (OUTPATIENT)
Dept: FAMILY MEDICINE CLINIC | Age: 57
End: 2017-05-24

## 2017-05-24 DIAGNOSIS — E78.2 MIXED HYPERLIPIDEMIA: Primary | ICD-10-CM

## 2017-05-24 NOTE — LETTER
5/26/2017 2:21 PM 
 
Ms. Karie Marks [address] [place] [address] Dear Kayla Lan: 
 
Please find your most recent results below. Resulted Orders LIPID PANEL Result Value Ref Range Cholesterol, total 237 (H) 100 - 199 mg/dL Triglyceride 39 0 - 149 mg/dL HDL Cholesterol 71 >39 mg/dL VLDL, calculated 8 5 - 40 mg/dL LDL, calculated 158 (H) 0 - 99 mg/dL Narrative Performed at:  [address]  [id number] : Ja Diaz MD, Phone:  [phone] HEPATIC FUNCTION PANEL Result Value Ref Range Protein, total 7.1 6.0 - 8.5 g/dL Albumin 4.2 3.5 - 5.5 g/dL Bilirubin, total 0.6 0.0 - 1.2 mg/dL Bilirubin, direct 0.15 0.00 - 0.40 mg/dL Alk. phosphatase 72 39 - 117 IU/L  
 AST (SGOT) 19 0 - 40 IU/L  
 ALT (SGPT) 11 0 - 32 IU/L Narrative Performed at:  [address]  [id number] : Ja Diaz MD, Phone:  [phone] CVD REPORT Result Value Ref Range INTERPRETATION Note Comment:  
   Supplement report is available. Narrative Performed at:  [facility] [address] [place] [address]  [id number] : Shana Salas PhD, Phone:  [phone] Please call me if you have any questions: [phone] Sincerely, Lab [facility]

## 2017-05-25 LAB
ALBUMIN SERPL-MCNC: 4.2 G/DL (ref 3.5–5.5)
ALP SERPL-CCNC: 72 IU/L (ref 39–117)
ALT SERPL-CCNC: 11 IU/L (ref 0–32)
AST SERPL-CCNC: 19 IU/L (ref 0–40)
BILIRUB DIRECT SERPL-MCNC: 0.15 MG/DL (ref 0–0.4)
BILIRUB SERPL-MCNC: 0.6 MG/DL (ref 0–1.2)
CHOLEST SERPL-MCNC: 237 MG/DL (ref 100–199)
HDLC SERPL-MCNC: 71 MG/DL
INTERPRETATION, 910389: NORMAL
LDLC SERPL CALC-MCNC: 158 MG/DL (ref 0–99)
PROT SERPL-MCNC: 7.1 G/DL (ref 6–8.5)
TRIGL SERPL-MCNC: 39 MG/DL (ref 0–149)
VLDLC SERPL CALC-MCNC: 8 MG/DL (ref 5–40)

## 2018-02-18 NOTE — PROGRESS NOTES
Spoke with patient after obtaining 2 patient identifiers--Lab results reviewed with the patient. Patient will discuss starting a statin at her OV on 4/5/17 with Dr. Maria Dolores Morales. 18-Feb-2018 20:36 18-Feb-2018 21:14

## 2018-02-19 NOTE — TELEPHONE ENCOUNTER
PCP: Manas Pickett MD    Last appt: 2017  No future appointments. Requested Prescriptions     Pending Prescriptions Disp Refills    scopolamine (TRANSDERM-SCOP) 1 mg over 3 days pt3d 4 Patch 1     Si Patch by TransDERmal route every seventy-two (72) hours.      Lab Results   Component Value Date/Time    Sodium 141 2017 08:20 AM    Potassium 4.3 2017 08:20 AM    Chloride 102 2017 08:20 AM    CO2 24 2017 08:20 AM    Anion gap 6 2012 04:45 AM    Glucose 89 2017 08:20 AM    BUN 11 2017 08:20 AM    Creatinine 0.83 2017 08:20 AM    BUN/Creatinine ratio 13 2017 08:20 AM    GFR est AA 91 2017 08:20 AM    GFR est non-AA 79 2017 08:20 AM    Calcium 9.6 2017 08:20 AM     No results found for: HBA1C, HGBE8, HME1ZDQL, LNQ6FPAF  Lab Results   Component Value Date/Time    Cholesterol, total 237 (H) 2017 08:24 AM    HDL Cholesterol 71 2017 08:24 AM    LDL, calculated 158 (H) 2017 08:24 AM    VLDL, calculated 8 2017 08:24 AM    Triglyceride 39 2017 08:24 AM    CHOL/HDL Ratio 3.7 2010 10:33 AM     Lab Results   Component Value Date/Time    TSH 1.380 2017 08:20 AM

## 2018-02-19 NOTE — TELEPHONE ENCOUNTER
----- Message from Kassidy Campos sent at 2/19/2018 11:46 AM EST -----  Regarding: Dr. Young Ink / refill   Patient is requesting a prescription \"transderm -scop 1.5 mg) . Please send to [facility]   best [phone]

## 2018-02-20 RX ORDER — SCOLOPAMINE TRANSDERMAL SYSTEM 1 MG/1
1 PATCH, EXTENDED RELEASE TRANSDERMAL
Qty: 4 PATCH | Refills: 1 | Status: SHIPPED | OUTPATIENT
Start: 2018-02-20 | End: 2018-07-26 | Stop reason: ALTCHOICE

## 2018-05-03 NOTE — TELEPHONE ENCOUNTER
PCP: Arthur Bowers MD    Last appt: 4/5/2017  No future appointments.     Requested Prescriptions     Pending Prescriptions Disp Refills    amLODIPine (NORVASC) 10 mg tablet [Pharmacy Med Name: AMLODIPINE BESYLATE TABS 10MG] 90 Tab 3     Sig: TAKE 1 TABLET DAILY     Lab Results   Component Value Date/Time    Sodium 141 03/29/2017 08:20 AM    Potassium 4.3 03/29/2017 08:20 AM    Chloride 102 03/29/2017 08:20 AM    CO2 24 03/29/2017 08:20 AM    Anion gap 6 03/08/2012 04:45 AM    Glucose 89 03/29/2017 08:20 AM    BUN 11 03/29/2017 08:20 AM    Creatinine 0.83 03/29/2017 08:20 AM    BUN/Creatinine ratio 13 03/29/2017 08:20 AM    GFR est AA 91 03/29/2017 08:20 AM    GFR est non-AA 79 03/29/2017 08:20 AM    Calcium 9.6 03/29/2017 08:20 AM     No results found for: HBA1C, HGBE8, LTN1QZQV, WIU2CFNY  Lab Results   Component Value Date/Time    Cholesterol, total 237 (H) 05/24/2017 08:24 AM    HDL Cholesterol 71 05/24/2017 08:24 AM    LDL, calculated 158 (H) 05/24/2017 08:24 AM    VLDL, calculated 8 05/24/2017 08:24 AM    Triglyceride 39 05/24/2017 08:24 AM    CHOL/HDL Ratio 3.7 11/11/2010 10:33 AM     Lab Results   Component Value Date/Time    TSH 1.380 03/29/2017 08:20 AM

## 2018-05-08 RX ORDER — AMLODIPINE BESYLATE 10 MG/1
TABLET ORAL
Qty: 90 TAB | Refills: 0 | Status: SHIPPED | OUTPATIENT
Start: 2018-05-08 | End: 2018-08-06 | Stop reason: SDUPTHER

## 2018-07-26 ENCOUNTER — OFFICE VISIT (OUTPATIENT)
Dept: FAMILY MEDICINE CLINIC | Age: 58
End: 2018-07-26

## 2018-07-26 VITALS
WEIGHT: 191.1 LBS | TEMPERATURE: 97.1 F | HEART RATE: 73 BPM | DIASTOLIC BLOOD PRESSURE: 77 MMHG | OXYGEN SATURATION: 98 % | BODY MASS INDEX: 28.96 KG/M2 | HEIGHT: 68 IN | RESPIRATION RATE: 14 BRPM | SYSTOLIC BLOOD PRESSURE: 119 MMHG

## 2018-07-26 DIAGNOSIS — I10 ESSENTIAL HYPERTENSION: ICD-10-CM

## 2018-07-26 DIAGNOSIS — E78.2 MIXED HYPERLIPIDEMIA: ICD-10-CM

## 2018-07-26 DIAGNOSIS — M70.71 ISCHIAL BURSITIS OF RIGHT SIDE: ICD-10-CM

## 2018-07-26 DIAGNOSIS — K21.9 GASTROESOPHAGEAL REFLUX DISEASE WITHOUT ESOPHAGITIS: ICD-10-CM

## 2018-07-26 DIAGNOSIS — Z00.00 LABORATORY EXAM ORDERED AS PART OF ROUTINE GENERAL MEDICAL EXAMINATION: ICD-10-CM

## 2018-07-26 DIAGNOSIS — E55.9 VITAMIN D DEFICIENCY: ICD-10-CM

## 2018-07-26 DIAGNOSIS — H61.21 RIGHT EAR IMPACTED CERUMEN: ICD-10-CM

## 2018-07-26 DIAGNOSIS — Z00.00 PHYSICAL EXAM: Primary | ICD-10-CM

## 2018-07-26 DIAGNOSIS — M77.11 RIGHT LATERAL EPICONDYLITIS: ICD-10-CM

## 2018-07-26 PROBLEM — R93.1 AGATSTON CAC SCORE, <100: Status: RESOLVED | Noted: 2017-04-17 | Resolved: 2018-07-26

## 2018-07-26 NOTE — PROGRESS NOTES
HISTORY OF PRESENT ILLNESS  Maximilian Zhu is a 63 y.o. female. HPI   Here for [facility]. Working out at [employer]. Having right elbow sxs. Right ischial sxs. Eye doctor past yr. Dentist 4 x annually. Colonoscopy '16. Mother passed past Dec.  5 yr repeat for polyps. Mammo and Gyn UTD annually. DEXA last yr. No signif hx past yr. Patient Active Problem List   Diagnosis Code    Mixed hyperlipidemia E78.2    Essential hypertension I10    GERD (gastroesophageal reflux disease) K21.9    Vitamin D deficiency E55.9    FH: colonic polyps Z83.71    Agatston CAC score, <100 R93.1     Current Outpatient Prescriptions   Medication Sig Dispense Refill    amLODIPine (NORVASC) 10 mg tablet TAKE 1 TABLET DAILY 90 Tab 0    omega 3-dha-epa-fish oil (FISH OIL) 100-160-1,000 mg cap Take 5 mL by mouth as needed.  meclizine (ANTIVERT) 25 mg tablet TAKE 1 TABLET BY MOUTH 4 TIMES A DAY AS NEEDED FOR DIZZINESS  0    esomeprazole (NEXIUM) 20 mg capsule Take  by mouth daily.        Allergies   Allergen Reactions    Contrast Agent [Iodine] Other (comments)     Increased heart rate     Past Medical History:   Diagnosis Date    Advance directive discussed with patient 03/15/2016    Anemia NEC     shwetha romero   2/11/16 f/u note    Atypical chest pain     dr Irma Lockhart [phone]    Back pain 03/2016    Colon polyp 11/03/2016    bx pending but report says 5 yr repeat    Dysphagia 2/24/14    notes from [facility] [facility] EGD planned    Elevated lipids     dr Saskia Adams [phone]    Eustachian tube dysfunction 01/10/2017    notes from ENT [facility] [address]  4/27/17    Fibrocystic breast disease     Fractured tarsal bone 10/2013    pt first    GERD (gastroesophageal reflux disease)     Head ache 2/9/15    tension headache per [facility] note    Headache(784.0) 11/2009    dr Arambula Bolus Hypercholesterolemia     Hypertension     Incontinence of urine     Labyrinthitis 01/13/2017    COREY Thornton note    Left knee pain 7/3013 6/4/14 [facility]    PPD positive, treated     INH but not sure length of tx    Screening for viral disease 12/3/15    titers neg for Hep A-B and C thru GYN    Tension type headache     Viral gastroenteritis [id number]    kilo notes     Past Surgical History:   Procedure Laterality Date    CT HEART W/O CONT WITH CALCIUM  3/2006    score 4.81    EGD  3/2002    dr Dani Dickens for Barretts    HX COLONOSCOPY  11/03/2016    5 yr recall-1 polyp removed    HX ORTHOPAEDIC  9/2010    torn meniscus dr aguero    HX ORTHOPAEDIC  1/18/10    ganglion cyst dr Ritchie Punch    [facility]  3/17/14    repeat EGD Dr Jolanta Harry FLX DX W/COLLJ [address] 1977 PFRMD  10/3/2011          Family History   Problem Relation Age of Onset    Cancer Father      pancreatic    Cancer Maternal Aunt      Social History   Substance Use Topics    Smoking status: Never Smoker    Smokeless tobacco: Never Used    Alcohol use Yes      Comment: wine occassional      Lab Results  Component Value Date/Time   WBC 4.5 03/29/2017 08:20 AM   HGB 13.5 03/29/2017 08:20 AM   HCT 41.8 03/29/2017 08:20 AM   PLATELET 046 12/97/6667 08:20 AM   MCV 83 03/29/2017 08:20 AM     Lab Results  Component Value Date/Time   Glucose 89 03/29/2017 08:20 AM   LDL, calculated 158 (H) 05/24/2017 08:24 AM   Creatinine 0.83 03/29/2017 08:20 AM      Lab Results  Component Value Date/Time   Cholesterol, total 237 (H) 05/24/2017 08:24 AM   HDL Cholesterol 71 05/24/2017 08:24 AM   LDL, calculated 158 (H) 05/24/2017 08:24 AM   Triglyceride 39 05/24/2017 08:24 AM   CHOL/HDL Ratio 3.7 11/11/2010 10:33 AM     Lab Results  Component Value Date/Time   ALT (SGPT) 11 05/24/2017 08:24 AM   AST (SGOT) 19 05/24/2017 08:24 AM   Alk.  phosphatase 72 05/24/2017 08:24 AM   Bilirubin, direct 0.15 05/24/2017 08:24 AM   Bilirubin, total 0.6 05/24/2017 08:24 AM   Albumin 4.2 05/24/2017 08:24 AM   Protein, total 7.1 05/24/2017 08:24 AM   INR 1.0 03/08/2012 04:45 AM Prothrombin time 10.1 03/08/2012 04:45 AM   PLATELET 784 80/43/3947 08:20 AM       Lab Results  Component Value Date/Time   GFR est non-AA 79 03/29/2017 08:20 AM   GFR est AA 91 03/29/2017 08:20 AM   Creatinine 0.83 03/29/2017 08:20 AM   BUN 11 03/29/2017 08:20 AM   Sodium 141 03/29/2017 08:20 AM   Potassium 4.3 03/29/2017 08:20 AM   Chloride 102 03/29/2017 08:20 AM   CO2 24 03/29/2017 08:20 AM     Lab Results  Component Value Date/Time   TSH 1.380 03/29/2017 08:20 AM      Lab Results   Component Value Date/Time    Glucose 89 03/29/2017 08:20 AM         Review of Systems   Constitutional: Positive for weight loss. HENT: Negative. Eyes: Negative. Respiratory: Negative. Cardiovascular: Negative. Gastrointestinal: Positive for heartburn. Genitourinary: Negative. Musculoskeletal: Positive for joint pain. Skin: Negative. Neurological: Negative. Endo/Heme/Allergies: Positive for environmental allergies. Psychiatric/Behavioral: Negative. Physical Exam   Vitals:    07/26/18 0911   BP: 119/77   Pulse: 73   Resp: 14   Temp: 97.1 °F (36.2 °C)   TempSrc: Oral   SpO2: 98%   Weight: 191 lb 1.6 oz (86.7 kg)   Height: 5' 8\" (1.727 m)       General    alert, cooperative, no distress, appears stated age   Head    Normocephalic, without obvious abnormality, atraumatic   Eyes    conjunctivae/corneas clear. PERRL. Fundi benign   Ears    Canal occluded with cerumen on right,  TM clear on left. Nose Passages patent. Mucosa normal. No drainage or sinus tenderness. Throat Lips, mucosa, and tongue normal. Teeth and gums normal.  Post pharynx neg. Neck supple, nontender, no adenopathy, thyroid: not enlarged, no masses/nodules, no carotid bruits   Back     symmetric, no curvature. FROM. No CVA tenderness   Lungs     clear to auscultation bilaterally   Breasts    Deferred per Gyn   Heart    Regular rate and rhythm, with no murmur, click, rub or gallop   Abdomen   Soft, non-tender.  Bowel sounds normal. No masses,  No organomegaly   Genitalia and Rectal  Deferred. Per GYN. Extremities   extremities normal, atraumatic, no cyanosis or edema   Pulses   1-2+ and symmetric   Skin No rashes or lesions       Neurologic Romberg neg, nml heel, toe and Tandem gait. DTRs 1-2+ symmetric except absent left patella. ASSESSMENT and PLAN    ICD-10-CM ICD-9-CM    1. Physical exam Z00.00 V70.9 VITAMIN D, 25 HYDROXY      CBC WITH AUTOMATED DIFF      URINALYSIS W/ RFLX MICROSCOPIC      TSH 3RD GENERATION      METABOLIC PANEL, COMPREHENSIVE      LIPID PANEL   2. Vitamin D deficiency E55.9 268.9 VITAMIN D, 25 HYDROXY   3. Mixed hyperlipidemia E78.2 272.2 LIPID PANEL   4. Essential hypertension I10 401.9 CBC WITH AUTOMATED DIFF      URINALYSIS W/ RFLX MICROSCOPIC      TSH 3RD GENERATION      METABOLIC PANEL, COMPREHENSIVE      LIPID PANEL   5. Gastroesophageal reflux disease without esophagitis K21.9 530.81    6. Laboratory exam ordered as part of routine general medical examination Z00.00 V72.62 VITAMIN D, 25 HYDROXY      CBC WITH AUTOMATED DIFF      URINALYSIS W/ RFLX MICROSCOPIC      TSH 3RD GENERATION      METABOLIC PANEL, COMPREHENSIVE      LIPID PANEL   7. Right ear impacted cerumen H61.21 380.4 REMOVE IMPACTED EAR WAX   8. Right lateral epicondylitis M77.11 726.32    9. Ischial bursitis of right side M70.71 726.5      Follow-up Disposition:  Return in about 1 year (around 7/26/2019) for [facility], labs.

## 2018-07-26 NOTE — PROGRESS NOTES
Blayne Mcleod is a 63 y.o. female      Chief Complaint   Patient presents with    Complete Physical    Labs     Pt is fasting for labs.  Elbow Pain     Right Elbow Pain and Hip Pain . X1 month       1. Have you been to the ER, urgent care clinic since your last visit? Hospitalized since your last visit? No    2. Have you seen or consulted any other health care providers outside of the [address] [place] [address] since your last visit? Include any pap smears or colon screening.  No

## 2018-07-27 LAB
25(OH)D3+25(OH)D2 SERPL-MCNC: 34.9 NG/ML (ref 30–100)
ALBUMIN SERPL-MCNC: 4.2 G/DL (ref 3.5–5.5)
ALBUMIN/GLOB SERPL: 1.6 {RATIO} (ref 1.2–2.2)
ALP SERPL-CCNC: 66 IU/L (ref 39–117)
ALT SERPL-CCNC: 8 IU/L (ref 0–32)
APPEARANCE UR: CLEAR
AST SERPL-CCNC: 20 IU/L (ref 0–40)
BASOPHILS # BLD AUTO: 0 X10E3/UL (ref 0–0.2)
BASOPHILS NFR BLD AUTO: 1 %
BILIRUB SERPL-MCNC: 0.4 MG/DL (ref 0–1.2)
BILIRUB UR QL STRIP: NEGATIVE
BUN SERPL-MCNC: 11 MG/DL (ref 6–24)
BUN/CREAT SERPL: 13 (ref 9–23)
CALCIUM SERPL-MCNC: 9.4 MG/DL (ref 8.7–10.2)
CHLORIDE SERPL-SCNC: 105 MMOL/L (ref 96–106)
CHOLEST SERPL-MCNC: 197 MG/DL (ref 100–199)
CO2 SERPL-SCNC: 24 MMOL/L (ref 20–29)
COLOR UR: YELLOW
CREAT SERPL-MCNC: 0.88 MG/DL (ref 0.57–1)
EOSINOPHIL # BLD AUTO: 0.2 X10E3/UL (ref 0–0.4)
EOSINOPHIL NFR BLD AUTO: 4 %
ERYTHROCYTE [DISTWIDTH] IN BLOOD BY AUTOMATED COUNT: 14.6 % (ref 12.3–15.4)
GLOBULIN SER CALC-MCNC: 2.7 G/DL (ref 1.5–4.5)
GLUCOSE SERPL-MCNC: 93 MG/DL (ref 65–99)
GLUCOSE UR QL: NEGATIVE
HCT VFR BLD AUTO: 42.9 % (ref 34–46.6)
HDLC SERPL-MCNC: 61 MG/DL
HGB BLD-MCNC: 13.4 G/DL (ref 11.1–15.9)
HGB UR QL STRIP: NEGATIVE
IMM GRANULOCYTES # BLD: 0 X10E3/UL (ref 0–0.1)
IMM GRANULOCYTES NFR BLD: 0 %
INTERPRETATION, 910389: NORMAL
KETONES UR QL STRIP: NEGATIVE
LDLC SERPL CALC-MCNC: 128 MG/DL (ref 0–99)
LEUKOCYTE ESTERASE UR QL STRIP: NEGATIVE
LYMPHOCYTES # BLD AUTO: 1.6 X10E3/UL (ref 0.7–3.1)
LYMPHOCYTES NFR BLD AUTO: 43 %
MCH RBC QN AUTO: 26.2 PG (ref 26.6–33)
MCHC RBC AUTO-ENTMCNC: 31.2 G/DL (ref 31.5–35.7)
MCV RBC AUTO: 84 FL (ref 79–97)
MICRO URNS: NORMAL
MONOCYTES # BLD AUTO: 0.3 X10E3/UL (ref 0.1–0.9)
MONOCYTES NFR BLD AUTO: 7 %
NEUTROPHILS # BLD AUTO: 1.8 X10E3/UL (ref 1.4–7)
NEUTROPHILS NFR BLD AUTO: 45 %
NITRITE UR QL STRIP: NEGATIVE
PH UR STRIP: 6 [PH] (ref 5–7.5)
PLATELET # BLD AUTO: 229 X10E3/UL (ref 150–379)
POTASSIUM SERPL-SCNC: 4.3 MMOL/L (ref 3.5–5.2)
PROT SERPL-MCNC: 6.9 G/DL (ref 6–8.5)
PROT UR QL STRIP: NEGATIVE
RBC # BLD AUTO: 5.12 X10E6/UL (ref 3.77–5.28)
SODIUM SERPL-SCNC: 142 MMOL/L (ref 134–144)
SP GR UR: 1.02 (ref 1–1.03)
TRIGL SERPL-MCNC: 42 MG/DL (ref 0–149)
TSH SERPL DL<=0.005 MIU/L-ACNC: 1.22 UIU/ML (ref 0.45–4.5)
UROBILINOGEN UR STRIP-MCNC: 0.2 MG/DL (ref 0.2–1)
VLDLC SERPL CALC-MCNC: 8 MG/DL (ref 5–40)
WBC # BLD AUTO: 3.8 X10E3/UL (ref 3.4–10.8)

## 2018-07-31 NOTE — PROGRESS NOTES
.Writer left message on the voice mail informing the pt to call the clinic when available.    7/31/18

## 2018-08-01 ENCOUNTER — TELEPHONE (OUTPATIENT)
Dept: FAMILY MEDICINE CLINIC | Age: 58
End: 2018-08-01

## 2018-08-01 NOTE — TELEPHONE ENCOUNTER
Writer called pt back in regards to lab results from Dr. Valentino Agent. Writer spoke with pt. Pt verified . Writer notified of recent lab results from Dr. Valentino Agent. Pt verbalized understanding and appreciation.

## 2019-07-29 ENCOUNTER — OFFICE VISIT (OUTPATIENT)
Dept: FAMILY MEDICINE CLINIC | Age: 59
End: 2019-07-29

## 2019-07-29 VITALS
HEIGHT: 68 IN | BODY MASS INDEX: 31.04 KG/M2 | SYSTOLIC BLOOD PRESSURE: 136 MMHG | RESPIRATION RATE: 18 BRPM | HEART RATE: 79 BPM | WEIGHT: 204.8 LBS | OXYGEN SATURATION: 100 % | TEMPERATURE: 97.7 F | DIASTOLIC BLOOD PRESSURE: 80 MMHG

## 2019-07-29 DIAGNOSIS — I10 ESSENTIAL HYPERTENSION: ICD-10-CM

## 2019-07-29 DIAGNOSIS — E55.9 VITAMIN D DEFICIENCY: ICD-10-CM

## 2019-07-29 DIAGNOSIS — Z23 ENCOUNTER FOR IMMUNIZATION: ICD-10-CM

## 2019-07-29 DIAGNOSIS — Z00.00 PHYSICAL EXAM: Primary | ICD-10-CM

## 2019-07-29 DIAGNOSIS — E78.2 MIXED HYPERLIPIDEMIA: ICD-10-CM

## 2019-07-29 RX ORDER — IBUPROFEN 200 MG
200 TABLET ORAL
COMMUNITY

## 2019-07-29 NOTE — PROGRESS NOTES
Karie Marks  Identified pt with two pt identifiers(name and ). Chief Complaint   Patient presents with    Complete Physical    Labs       1. Have you been to the ER, urgent care clinic since your last visit? Hospitalized since your last visit? No    2. Have you seen or consulted any other health care providers outside of the [address] [place] [address] since your last visit? Include any pap smears or colon screening. Dr. Antonina Wright      Would you like to sign up for MyChart today, if you have not already done so? Patient has a mychart  If not, would you like information on MyChart, and how to sign up at a later time? No      Medication reconciliation up to date and corrected with patient at this time. Today's provider has been notified of reason for visit, vitals and flowsheets obtained on patients. Reviewed record in preparation for visit, huddled with provider and have obtained necessary documentation.       Health Maintenance Due   Topic    DTaP/Tdap/Td series (1 - Tdap)    Shingrix Vaccine Age 50> (1 of 2)    PAP AKA CERVICAL CYTOLOGY     BREAST CANCER SCRN MAMMOGRAM        Wt Readings from Last 3 Encounters:   19 204 lb 12.8 oz (92.9 kg)   18 191 lb 1.6 oz (86.7 kg)   17 204 lb 12.8 oz (92.9 kg)     Temp Readings from Last 3 Encounters:   19 97.7 °F (36.5 °C) (Oral)   18 97.1 °F (36.2 °C) (Oral)   17 97.7 °F (36.5 °C) (Oral)     BP Readings from Last 3 Encounters:   19 145/81   18 119/77   17 120/80     Pulse Readings from Last 3 Encounters:   19 79   18 73   17 75     Vitals:    19 0851   BP: 145/81   Pulse: 79   Resp: 18   Temp: 97.7 °F (36.5 °C)   TempSrc: Oral   SpO2: 100%   Weight: 204 lb 12.8 oz (92.9 kg)   Height: 5' 8.25\" (1.734 m)   PainSc:   0 - No pain         Learning Assessment:  :     Learning Assessment 2015   PRIMARY LEARNER Patient   HIGHEST LEVEL OF EDUCATION - PRIMARY LEARNER  SOME COLLEGE BARRIERS PRIMARY LEARNER NONE   CO-LEARNER CAREGIVER No   PRIMARY LANGUAGE ENGLISH   LEARNER PREFERENCE PRIMARY DEMONSTRATION       Depression Screening:  :     3 most recent PHQ Screens 7/29/2019   Little interest or pleasure in doing things Not at all   Feeling down, depressed, irritable, or hopeless Not at all   Total Score PHQ 2 0       Fall Risk Assessment:  :     Fall Risk Assessment, last 12 mths 7/29/2019   Able to walk? Yes   Fall in past 12 months? No       Abuse Screening:  :     Abuse Screening Questionnaire 7/29/2019   Do you ever feel afraid of your partner? N   Are you in a relationship with someone who physically or mentally threatens you? N   Is it safe for you to go home?  Y       ADL Screening:  :     ADL Assessment 7/29/2019   Feeding yourself No Help Needed   Getting from bed to chair No Help Needed   Getting dressed No Help Needed   Bathing or showering No Help Needed   Walk across the room (includes cane/walker) No Help Needed   Using the telphone No Help Needed   Taking your medications No Help Needed   Preparing meals No Help Needed   Managing money (expenses/bills) No Help Needed   Moderately strenuous housework (laundry) No Help Needed   Shopping for personal items (toiletries/medicines) No Help Needed   Shopping for groceries No Help Needed   Driving No Help Needed   Climbing a flight of stairs No Help Needed   Getting to places beyond walking distances No Help Needed

## 2019-07-29 NOTE — PROGRESS NOTES
Immunization/s administered on 7/29/2019 by Amos Primrose Riffell per Sedonia Glance, MD with patients consent signed. Patient tolerated procedure well. No reactions noted.

## 2019-07-29 NOTE — PROGRESS NOTES
HISTORY OF PRESENT ILLNESS  Sosa Valentin is a 63 y.o. female. HPI   Here for [facility]. Works out at gym 4 days weekly. Eye doctor past yr. Dentist 4 x annually. Colonoscopy '13.  5 yr repeat for polyps. Mammo and gyn annually. DEXA '17. No signif hx past yr. Patient Active Problem List   Diagnosis Code    Mixed hyperlipidemia E78.2    Essential hypertension I10    GERD (gastroesophageal reflux disease) K21.9    Vitamin D deficiency E55.9    FH: colonic polyps Z83.71    Right lateral epicondylitis M77.11    Ischial bursitis of right side M70.71     Current Outpatient Medications   Medication Sig Dispense Refill    ibuprofen (MOTRIN) 200 mg tablet Take 200 mg by mouth.  varicella-zoster recombinant, PF, (SHINGRIX, PF,) 50 mcg/0.5 mL susr injection 0.5 mL by IntraMUSCular route once for 1 dose. 0.5 mL 1    amLODIPine (NORVASC) 10 mg tablet TAKE 1 TABLET DAILY 90 Tab 3    meclizine (ANTIVERT) 25 mg tablet TAKE 1 TABLET BY MOUTH 4 TIMES A DAY AS NEEDED FOR DIZZINESS  0    esomeprazole (NEXIUM) 20 mg capsule Take  by mouth daily.  omega 3-dha-epa-fish oil (FISH OIL) 100-160-1,000 mg cap Take 5 mL by mouth as needed.        Allergies   Allergen Reactions    Contrast Agent [Iodine] Other (comments)     Increased heart rate     Past Medical History:   Diagnosis Date    Advance directive discussed with patient 03/15/2016    Anemia NEC     shwetha romero   2/11/16 f/u note    Atypical chest pain     dr Jeanna Mcdermott [phone]    Back pain 03/2016    Colon polyp 11/03/2016    bx pending but report says 5 yr repeat    Dysphagia 2/24/14    notes from [address] EGD planned    Elevated lipids     dr Brunilda Mae [phone]    Eustachian tube dysfunction 01/10/2017    notes from [facility]  4/27/17    Fibrocystic breast disease     Fractured tarsal bone 10/2013    pt first    GERD (gastroesophageal reflux disease)     Head ache 2/9/15    tension headache per Pt First note    Headache(784.0) 11/2009    dr Nicole Erps Hypercholesterolemia     Hypertension     Incontinence of urine     Labyrinthitis 01/13/2017    PF Norberto note    Left knee pain 7/3013    [facility]    PPD positive, treated     INH but not sure length of tx    Screening for viral disease 12/3/15    titers neg for Hep A-B and C thru GYN    Tension type headache     Viral gastroenteritis [id number]    kilo notes     Past Surgical History:   Procedure Laterality Date    CT HEART W/O CONT WITH CALCIUM  3/2006    score 4.81    EGD  3/2002    dr Koehler Notice for [facility]    HX COLONOSCOPY  11/03/2016    5 yr recall-1 polyp removed    HX ORTHOPAEDIC  9/2010    torn meniscus dr aguero    HX ORTHOPAEDIC  1/18/10    ganglion cyst dr Suellen Zayas    [facility]  3/17/14    repeat EGD Dr Roblero Mons FLX DX W/COLLJ [address] PFRMD  10/3/2011          Family History   Problem Relation Age of Onset    Cancer Father         pancreatic    Cancer Maternal Aunt      Social History     Tobacco Use    Smoking status: Never Smoker    Smokeless tobacco: Never Used   Substance Use Topics    Alcohol use: Yes     Comment: wine occassional      Lab Results   Component Value Date/Time    WBC 3.8 07/26/2018 10:19 AM    HGB 13.4 07/26/2018 10:19 AM    HCT 42.9 07/26/2018 10:19 AM    PLATELET 031 19/33/0673 10:19 AM    MCV 84 07/26/2018 10:19 AM     Lab Results   Component Value Date/Time    Glucose 93 07/26/2018 10:19 AM    LDL, calculated 128 (H) 07/26/2018 10:19 AM    Creatinine 0.88 07/26/2018 10:19 AM      Lab Results   Component Value Date/Time    Cholesterol, total 197 07/26/2018 10:19 AM    HDL Cholesterol 61 07/26/2018 10:19 AM    LDL, calculated 128 (H) 07/26/2018 10:19 AM    Triglyceride 42 07/26/2018 10:19 AM    CHOL/HDL Ratio 3.7 11/11/2010 10:33 AM     Lab Results   Component Value Date/Time    ALT (SGPT) 8 07/26/2018 10:19 AM    AST (SGOT) 20 07/26/2018 10:19 AM    Alk.  phosphatase 66 07/26/2018 10:19 AM    Bilirubin, direct 0.15 05/24/2017 08:24 AM    Bilirubin, total 0.4 07/26/2018 10:19 AM    Albumin 4.2 07/26/2018 10:19 AM    Protein, total 6.9 07/26/2018 10:19 AM    INR 1.0 03/08/2012 04:45 AM    Prothrombin time 10.1 03/08/2012 04:45 AM    PLATELET 612 35/81/4772 10:19 AM     Lab Results   Component Value Date/Time    GFR est non-AA 73 07/26/2018 10:19 AM    GFR est AA 84 07/26/2018 10:19 AM    Creatinine 0.88 07/26/2018 10:19 AM    BUN 11 07/26/2018 10:19 AM    Sodium 142 07/26/2018 10:19 AM    Potassium 4.3 07/26/2018 10:19 AM    Chloride 105 07/26/2018 10:19 AM    CO2 24 07/26/2018 10:19 AM     Lab Results   Component Value Date/Time    TSH 1.220 07/26/2018 10:19 AM      Lab Results   Component Value Date/Time    Glucose 93 07/26/2018 10:19 AM       Fasting for labs  Review of Systems   Constitutional: Negative. HENT: Negative. Eyes: Negative. Respiratory: Negative. Cardiovascular: Negative. Gastrointestinal: Negative. Genitourinary: Negative. Musculoskeletal: Positive for joint pain. Skin: Negative. Neurological: Positive for dizziness. Psychiatric/Behavioral: Negative. Physical Exam   Vitals:    07/29/19 0851 07/29/19 0942 07/29/19 1030   BP: 145/81 150/90 136/80   Pulse: 79     Resp: 18     Temp: 97.7 °F (36.5 °C)     TempSrc: Oral     SpO2: 100%     Weight: 204 lb 12.8 oz (92.9 kg)     Height: 5' 8.25\" (1.734 m)         General    alert, cooperative, no distress, appears stated age   Head    Normocephalic, without obvious abnormality, atraumatic   Eyes    conjunctivae/corneas clear. PERRL. Fundi benign   Ears    Canals and TMs clear   Nose Passages patent. Mucosa normal. No drainage or sinus tenderness. Throat Lips, mucosa, and tongue normal. Teeth and gums normal.  Post pharynx neg. Neck supple, nontender, no adenopathy, thyroid: not enlarged, no masses/nodules, no carotid bruits   Back     symmetric, no curvature. FROM.  No CVA tenderness Lungs     clear to auscultation bilaterally   Breasts    Declined   Heart    Regular rate and rhythm, with no murmur, click, rub or gallop   Abdomen   Soft, non-tender. Bowel sounds normal. No masses,  No organomegaly   Genitalia and Rectal  Deferred. Per GYN. Extremities   extremities normal, atraumatic, no cyanosis or edema   Pulses   1-2+ and symmetric   Skin No rashes or lesions       Neurologic Romberg neg, nml heel, toe and Tandem gait. DTRs 1-2+ symmetric except absent left patella and Achilles. ASSESSMENT and PLAN    ICD-10-CM ICD-9-CM    1. Physical exam Z00.00 V70.9 VITAMIN D, 25 HYDROXY      CBC WITH AUTOMATED DIFF      URINALYSIS W/ RFLX MICROSCOPIC      TSH 3RD GENERATION      METABOLIC PANEL, COMPREHENSIVE      LIPID PANEL   2. Encounter for immunization Z23 V03.89 TETANUS, DIPHTHERIA TOXOIDS AND ACELLULAR PERTUSSIS VACCINE (TDAP), IN INDIVIDS. >=7, IM   3. Essential hypertension I10 401.9 CBC WITH AUTOMATED DIFF      URINALYSIS W/ RFLX MICROSCOPIC      TSH 3RD GENERATION      METABOLIC PANEL, COMPREHENSIVE      LIPID PANEL   4. Mixed hyperlipidemia E78.2 272.2 LIPID PANEL   5. Vitamin D deficiency E55.9 268.9 VITAMIN D, 25 HYDROXY     Follow-up and Dispositions    · Return in about 1 year (around 7/29/2020) for [facility], labs.

## 2019-07-30 LAB
25(OH)D3+25(OH)D2 SERPL-MCNC: 42.4 NG/ML (ref 30–100)
ALBUMIN SERPL-MCNC: 4.3 G/DL (ref 3.5–5.5)
ALBUMIN/GLOB SERPL: 1.5 {RATIO} (ref 1.2–2.2)
ALP SERPL-CCNC: 71 IU/L (ref 39–117)
ALT SERPL-CCNC: 10 IU/L (ref 0–32)
APPEARANCE UR: CLEAR
AST SERPL-CCNC: 17 IU/L (ref 0–40)
BASOPHILS # BLD AUTO: 0 X10E3/UL (ref 0–0.2)
BASOPHILS NFR BLD AUTO: 1 %
BILIRUB SERPL-MCNC: 0.5 MG/DL (ref 0–1.2)
BILIRUB UR QL STRIP: NEGATIVE
BUN SERPL-MCNC: 12 MG/DL (ref 6–24)
BUN/CREAT SERPL: 16 (ref 9–23)
CALCIUM SERPL-MCNC: 9.4 MG/DL (ref 8.7–10.2)
CHLORIDE SERPL-SCNC: 104 MMOL/L (ref 96–106)
CHOLEST SERPL-MCNC: 252 MG/DL (ref 100–199)
CO2 SERPL-SCNC: 24 MMOL/L (ref 20–29)
COLOR UR: YELLOW
CREAT SERPL-MCNC: 0.76 MG/DL (ref 0.57–1)
EOSINOPHIL # BLD AUTO: 0.2 X10E3/UL (ref 0–0.4)
EOSINOPHIL NFR BLD AUTO: 5 %
ERYTHROCYTE [DISTWIDTH] IN BLOOD BY AUTOMATED COUNT: 13.2 % (ref 12.3–15.4)
GLOBULIN SER CALC-MCNC: 2.8 G/DL (ref 1.5–4.5)
GLUCOSE SERPL-MCNC: 89 MG/DL (ref 65–99)
GLUCOSE UR QL: NEGATIVE
HCT VFR BLD AUTO: 41.9 % (ref 34–46.6)
HDLC SERPL-MCNC: 71 MG/DL
HGB BLD-MCNC: 13.4 G/DL (ref 11.1–15.9)
HGB UR QL STRIP: NEGATIVE
IMM GRANULOCYTES # BLD AUTO: 0 X10E3/UL (ref 0–0.1)
IMM GRANULOCYTES NFR BLD AUTO: 0 %
INTERPRETATION, 910389: NORMAL
KETONES UR QL STRIP: NEGATIVE
LDLC SERPL CALC-MCNC: 172 MG/DL (ref 0–99)
LEUKOCYTE ESTERASE UR QL STRIP: NEGATIVE
LYMPHOCYTES # BLD AUTO: 1.6 X10E3/UL (ref 0.7–3.1)
LYMPHOCYTES NFR BLD AUTO: 38 %
MCH RBC QN AUTO: 26.3 PG (ref 26.6–33)
MCHC RBC AUTO-ENTMCNC: 32 G/DL (ref 31.5–35.7)
MCV RBC AUTO: 82 FL (ref 79–97)
MICRO URNS: NORMAL
MONOCYTES # BLD AUTO: 0.4 X10E3/UL (ref 0.1–0.9)
MONOCYTES NFR BLD AUTO: 10 %
NEUTROPHILS # BLD AUTO: 2 X10E3/UL (ref 1.4–7)
NEUTROPHILS NFR BLD AUTO: 46 %
NITRITE UR QL STRIP: NEGATIVE
PH UR STRIP: 6.5 [PH] (ref 5–7.5)
PLATELET # BLD AUTO: 255 X10E3/UL (ref 150–450)
POTASSIUM SERPL-SCNC: 4.3 MMOL/L (ref 3.5–5.2)
PROT SERPL-MCNC: 7.1 G/DL (ref 6–8.5)
PROT UR QL STRIP: NEGATIVE
RBC # BLD AUTO: 5.09 X10E6/UL (ref 3.77–5.28)
SODIUM SERPL-SCNC: 142 MMOL/L (ref 134–144)
SP GR UR: 1.02 (ref 1–1.03)
TRIGL SERPL-MCNC: 43 MG/DL (ref 0–149)
TSH SERPL DL<=0.005 MIU/L-ACNC: 1.33 UIU/ML (ref 0.45–4.5)
UROBILINOGEN UR STRIP-MCNC: 0.2 MG/DL (ref 0.2–1)
VLDLC SERPL CALC-MCNC: 9 MG/DL (ref 5–40)
WBC # BLD AUTO: 4.3 X10E3/UL (ref 3.4–10.8)

## 2019-07-30 NOTE — PROGRESS NOTES
Lipids back up again. Rec get repeat heart scan and restart low dose statin if still 0 score.   Rest of labs all O.K.

## 2019-07-31 ENCOUNTER — TELEPHONE (OUTPATIENT)
Dept: FAMILY MEDICINE CLINIC | Age: 59
End: 2019-07-31

## 2019-07-31 DIAGNOSIS — E78.2 MIXED HYPERLIPIDEMIA: Primary | ICD-10-CM

## 2019-08-06 RX ORDER — AMLODIPINE BESYLATE 10 MG/1
TABLET ORAL
Qty: 90 TAB | Refills: 3 | Status: SHIPPED | OUTPATIENT
Start: 2019-08-06 | End: 2020-05-07 | Stop reason: SDUPTHER

## 2019-08-06 NOTE — TELEPHONE ENCOUNTER
PCP: Rose Sousa MD    Last appt: 7/29/2019  Future Appointments   Date Time Provider Abel Acosta   7/30/2020  8:15 AM Teja Laboy MD [facility]       Requested Prescriptions     Pending Prescriptions Disp Refills    amLODIPine (NORVASC) 10 mg tablet [Pharmacy Med Name: AMLODIPINE BESYLATE TABS 10MG] 90 Tab 3     Sig: TAKE 1 TABLET DAILY       Prior labs and Blood pressures:  BP Readings from Last 3 Encounters:   07/29/19 136/80   07/26/18 119/77   04/17/17 120/80     Lab Results   Component Value Date/Time    Sodium 142 07/29/2019 09:55 AM    Potassium 4.3 07/29/2019 09:55 AM    Chloride 104 07/29/2019 09:55 AM    CO2 24 07/29/2019 09:55 AM    Anion gap 6 03/08/2012 04:45 AM    Glucose 89 07/29/2019 09:55 AM    BUN 12 07/29/2019 09:55 AM    Creatinine 0.76 07/29/2019 09:55 AM    BUN/Creatinine ratio 16 07/29/2019 09:55 AM    GFR est  07/29/2019 09:55 AM    GFR est non-AA 87 07/29/2019 09:55 AM    Calcium 9.4 07/29/2019 09:55 AM     No results found for: HBA1C, HGBE8, [id number], DCE8UBHR  Lab Results   Component Value Date/Time    Cholesterol, total 252 (H) 07/29/2019 09:55 AM    HDL Cholesterol 71 07/29/2019 09:55 AM    LDL, calculated 172 (H) 07/29/2019 09:55 AM    VLDL, calculated 9 07/29/2019 09:55 AM    Triglyceride 43 07/29/2019 09:55 AM    CHOL/HDL Ratio 3.7 11/11/2010 10:33 AM     Lab Results   Component Value Date/Time    Vitamin D 25-Hydroxy 28.3 (L) 09/13/2011 09:27 AM    VITAMIN D, 25-HYDROXY 42.4 07/29/2019 09:55 AM       Lab Results   Component Value Date/Time    TSH 1.330 07/29/2019 09:55 AM

## 2019-09-26 ENCOUNTER — TELEPHONE (OUTPATIENT)
Dept: FAMILY MEDICINE CLINIC | Age: 59
End: 2019-09-26

## 2019-09-26 NOTE — TELEPHONE ENCOUNTER
Patient called in requesting a call back from Nurse Kiera Lozoya in regards to her Health and Wellness form being completed.    P: [phone]

## 2019-09-27 NOTE — TELEPHONE ENCOUNTER
Writer called patient to notify her that form has been filled out, but needed Dr. Nehal Duke. Writer spoke with patient. Patient verified . Writer notified patient, and stated that it would emailed from her from printer/fax machine on Monday after Dr. Jose Garcia signs it. Patient verbalized understanding and appreciation.

## 2020-02-05 ENCOUNTER — OFFICE VISIT (OUTPATIENT)
Dept: FAMILY MEDICINE CLINIC | Age: 60
End: 2020-02-05

## 2020-02-05 VITALS
TEMPERATURE: 96.5 F | SYSTOLIC BLOOD PRESSURE: 133 MMHG | RESPIRATION RATE: 18 BRPM | BODY MASS INDEX: 30.07 KG/M2 | OXYGEN SATURATION: 97 % | HEART RATE: 79 BPM | DIASTOLIC BLOOD PRESSURE: 70 MMHG | HEIGHT: 68 IN | WEIGHT: 198.4 LBS

## 2020-02-05 DIAGNOSIS — M25.552 ACUTE HIP PAIN, LEFT: Primary | ICD-10-CM

## 2020-02-05 NOTE — PROGRESS NOTES
Jahaira Grvoes is a 63 y.o. female  Chief Complaint   Patient presents with    Other     Sciatica nerve issue     Health Maintenance Due   Topic Date Due    Shingrix Vaccine Age 49> (1 of 2) 02/14/2011    Influenza Age 5 to Adult  08/01/2019     Visit Vitals  /70   Pulse 79   Temp 96.5 °F (35.8 °C) (Oral)   Resp 18   Ht 5' 8.25\" (1.734 m)   Wt 198 lb 6.4 oz (90 kg)   SpO2 97%   BMI 29.95 kg/m²     1. Have you been to the ER, urgent care clinic since your last visit? Hospitalized since your last visit? No    2. Have you seen or consulted any other health care providers outside of the [address] [place] [address] since your last visit? Include any pap smears or colon screening. No     Sciatic nerve issue is around the back/hip/upper leg on the left side. Described as a sharp pain. Then turns into a nagging pain depending on moving.

## 2020-02-05 NOTE — PROGRESS NOTES
Sxs began 3 weeks ago. At gym doing shoulder pulls. Felt sharp pain side of hip. Bothers to lean into it and if flexes hip. Sxs laying on sides. Visit Vitals  /70   Pulse 79   Temp 96.5 °F (35.8 °C) (Oral)   Resp 18   Ht 5' 8.25\" (1.734 m)   Wt 198 lb 6.4 oz (90 kg)   LMP 02/05/2018 (Approximate)   SpO2 97%   BMI 29.95 kg/m²     Patient alert and cooperative. Right hip - normal flexion, internal and external rotation. Left hip - symptoms on end flexion, internal rotation, not external.  Also noticed symptoms with going from supine to sitting, hip flexion in sitting position, left lateral bending. Assessment:  1. Left hip pain, appears musculoskeletal.    Plan:  1. Set up PT, physical modalities. 2. Follow up if not improving/worse for ortho referral.  3. Recheck here otherwise prn.

## 2020-03-11 ENCOUNTER — HOSPITAL ENCOUNTER (EMERGENCY)
Age: 60
Discharge: HOME | End: 2020-03-11
Attending: EMERGENCY MEDICINE
Payer: COMMERCIAL

## 2020-03-11 VITALS
TEMPERATURE: 98.2 F | DIASTOLIC BLOOD PRESSURE: 80 MMHG | HEIGHT: 68 IN | OXYGEN SATURATION: 100 % | RESPIRATION RATE: 18 BRPM | BODY MASS INDEX: 30.71 KG/M2 | WEIGHT: 202.6 LBS | SYSTOLIC BLOOD PRESSURE: 136 MMHG | HEART RATE: 86 BPM

## 2020-03-11 DIAGNOSIS — S09.90XA CLOSED HEAD INJURY, INITIAL ENCOUNTER: ICD-10-CM
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
## 2020-03-11 PROCEDURE — 99283 EMERGENCY DEPT VISIT LOW MDM: CPT
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
## 2020-03-11 NOTE — ED NOTES
Pt ambulatory out of ED with discharge instructions in hand given by Dr. Alfonso Macario; pt verbalized understanding of discharge paperwork and time allotted for questions. VSS. Pt alert and oriented. Pt accompanied by spouse.
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
## 2020-05-07 RX ORDER — AMLODIPINE BESYLATE 10 MG/1
TABLET ORAL
Qty: 90 TAB | Refills: 0 | Status: SHIPPED | OUTPATIENT
Start: 2020-05-07 | End: 2020-08-03

## 2020-05-07 NOTE — TELEPHONE ENCOUNTER
Requested Prescriptions     Pending Prescriptions Disp Refills    amLODIPine (NORVASC) 10 mg tablet 90 Tab 3     PATIENT NEEDS A REFILL SENT INTO PHARMACY FOR A 30 DAY SUPPLY

## 2020-07-27 ENCOUNTER — TELEPHONE (OUTPATIENT)
Dept: FAMILY MEDICINE CLINIC | Age: 60
End: 2020-07-27

## 2020-07-27 DIAGNOSIS — I10 ESSENTIAL HYPERTENSION: ICD-10-CM

## 2020-07-27 DIAGNOSIS — E55.9 VITAMIN D DEFICIENCY: ICD-10-CM

## 2020-07-27 DIAGNOSIS — I10 ESSENTIAL HYPERTENSION: Primary | ICD-10-CM

## 2020-07-27 DIAGNOSIS — E78.2 MIXED HYPERLIPIDEMIA: ICD-10-CM

## 2020-07-27 DIAGNOSIS — Z23 ENCOUNTER FOR IMMUNIZATION: Primary | ICD-10-CM

## 2020-07-27 RX ORDER — ADJUVANT AS01B/PF, VIAL 1 OF 2
1 VIAL (ML) INTRAMUSCULAR ONCE
Qty: 1 EACH | Refills: 0 | Status: SHIPPED | OUTPATIENT
Start: 2020-07-27 | End: 2020-07-27

## 2020-07-28 ENCOUNTER — TELEPHONE (OUTPATIENT)
Dept: FAMILY MEDICINE CLINIC | Age: 60
End: 2020-07-28

## 2020-08-03 RX ORDER — AMLODIPINE BESYLATE 10 MG/1
TABLET ORAL
Qty: 30 TAB | Refills: 0 | Status: SHIPPED | OUTPATIENT
Start: 2020-08-03 | End: 2020-08-30

## 2020-09-02 ENCOUNTER — TELEPHONE (OUTPATIENT)
Dept: FAMILY MEDICINE CLINIC | Age: 60
End: 2020-09-02

## 2020-09-02 NOTE — TELEPHONE ENCOUNTER
The patient was angry because she was told by someone that Dr. Chacho Garcia was able to do a in person visit for her because she was having back spasms. The patient did not want to set up a VV with Dr Chacho Garcia when he gets back on September 8th. The patient would like a phone call back from the nurse if she can get in touch with Dr. Chacho Garcia for an in person visit. Please call [phone].

## 2020-09-04 NOTE — TELEPHONE ENCOUNTER
Returned phone call to pt. Pt states that she has been having some chest discomfort and back pain for about a month now. Offered to schedule her a OV with Olive Nina or Bonifacio Melendez, pt declined visit and stated that she will wait until Dr. Wilner Vu comes into the office. Advised that no one knows when that may be and pt states that she will wait. Verified understanding.

## 2020-09-18 ENCOUNTER — TELEPHONE (OUTPATIENT)
Dept: FAMILY MEDICINE CLINIC | Age: 60
End: 2020-09-18

## 2020-09-18 NOTE — TELEPHONE ENCOUNTER
called patient [phone] to schedule lab appointment, patient said she will call back but she has her lab order 9/18/2020 SW

## 2020-09-27 NOTE — PROGRESS NOTES
[facility], [address] [place] [address]  [phone]     Subjective:      Symone Lawrence is a 62 y.o. female is here to reestablish care. Last seen by us in 4/17. She has pmhx HTN HLD  Mitral Regurg  GERD And CAC score of 0 in 2014. She has been doing well up until last month, when she experienced some midsternal chest pressure during intercourse. Felt as if her sternum was bruised. No associated sob. She has about 2 more episodes but related to positional changes or  If she presses on her chest bone. Prior to covid, she has been going to the gym doing cardiovascular exercises and weight training. Now, she works out from home. She does not want to take statin medication due to risk of side effects. The patient denies  shortness of breath, orthopnea, PND, LE edema, palpitations, syncope, or presyncope.        Patient Active Problem List    Diagnosis Date Noted    Right lateral epicondylitis 07/26/2018    Ischial bursitis of right side 07/26/2018    FH: colonic polyps 03/15/2016    Vitamin D deficiency 09/13/2011    Mixed hyperlipidemia 09/18/2009    Essential hypertension 09/18/2009    GERD (gastroesophageal reflux disease) 09/18/2009      Mercy Laboy MD  Past Medical History:   Diagnosis Date    Advance directive discussed with patient 03/15/2016    Anemia NEC     shwetha romero   2/11/16 f/u note    Atypical chest pain     dr Jacky Otoole [phone]    Back pain 03/2016    Colon polyp 11/03/2016    bx pending but report says 5 yr repeat    Dysphagia 2/24/14    notes from [address] EGD planned    Elevated lipids     dr Rosanna Camacho [phone]    Eustachian tube dysfunction 01/10/2017    notes [facility]  4/27/17    Fibrocystic breast disease     Fractured tarsal bone 10/2013    pt first    GERD (gastroesophageal reflux disease)     Head ache 2/9/15    tension headache per Pt First note    Headache(784.0) 11/2009    dr Immanuel Flynn Hypercholesterolemia     Hypertension     Incontinence of urine     Labyrinthitis 01/13/2017    PF Norberto note    Left knee pain 7/3013 6/4/14 notes from [address] PPD positive, treated     INH but not sure length of tx    Screening for viral disease 12/3/15    titers neg for Hep A-B and C thru GYN    Tension type headache     Viral gastroenteritis [id number]    kilo notes      Past Surgical History:   Procedure Laterality Date    CT HEART W/O CONT WITH CALCIUM  3/2006    score 4.81    EGD  3/2002    dr Melonie Fernández for Barretts    HX COLONOSCOPY  11/03/2016    5 yr recall-1 polyp removed    HX ORTHOPAEDIC  9/2010    torn meniscus dr aguero    HX ORTHOPAEDIC  1/18/10    ganglion cyst dr Nickolas Fuchs  3/17/14    repeat EGD Dr Juli Muller FLX DX W/COLLJ [place] 1978 PFRMD  10/3/2011          Allergies   Allergen Reactions    Contrast Agent [Iodine] Other (comments)     Increased heart rate      Family History   Problem Relation Age of Onset    Cancer Father         pancreatic    Cancer Maternal Aunt       Social History     Socioeconomic History    Marital status:      Spouse name: Not on file    Number of children: Not on file    Years of education: Not on file    Highest education level: Not on file   Occupational History    Not on file   Social Needs    Financial resource strain: Not on file    Food insecurity     Worry: Not on file     Inability: Not on file    Transportation needs     Medical: Not on file     Non-medical: Not on file   Tobacco Use    Smoking status: Never Smoker    Smokeless tobacco: Never Used   Substance and Sexual Activity    Alcohol use: Yes     Comment: wine occassional    Drug use: No    Sexual activity: Yes     Partners: Male   Lifestyle    Physical activity     Days per week: Not on file     Minutes per session: Not on file    Stress: Not on file   Relationships    Social connections     Talks on phone: Not on file     Gets together: Not on file     Attends Alevism service: Not on file     Active member of club or organization: Not on file     Attends meetings of clubs or organizations: Not on file     Relationship status: Not on file    Intimate partner violence     Fear of current or ex partner: Not on file     Emotionally abused: Not on file     Physically abused: Not on file     Forced sexual activity: Not on file   Other Topics Concern    Not on file   Social History Narrative    Not on file      Current Outpatient Medications   Medication Sig    amLODIPine (NORVASC) 10 mg tablet TAKE 1 TABLET BY MOUTH EVERY DAY    ibuprofen (MOTRIN) 200 mg tablet Take 200 mg by mouth. No current facility-administered medications for this visit. Review of Symptoms:  11 systems reviewed, negative other than as stated in the HPI    Physical ExamPhysical Exam:    Vitals:    09/28/20 1347 09/28/20 1356   BP: (!) 142/82 (!) 146/86   Pulse: 83    Resp: 18    SpO2: 97%    Weight: 206 lb (93.4 kg)    Height: 5' 8\" (1.727 m)      Body mass index is 31.32 kg/m². General PE  Gen:  NAD  Mental Status - Alert. General Appearance - Not in acute distress. HEENT:  PERRL, no carotid bruits or JVD  Chest and Lung Exam   Inspection: Accessory muscles - No use of accessory muscles in breathing. Auscultation:   Breath sounds: - Normal.   Cardiovascular   Inspection: Jugular vein - Bilateral - Inspection Normal.   Palpation/Percussion:   Apical Impulse: - Normal.   Auscultation: Rhythm - Regular. Heart Sounds - S1 WNL and S2 WNL. No S3 or S4. Murmurs & Other Heart Sounds: Auscultation of the heart reveals - No Murmurs. Peripheral Vascular   Upper Extremity: Inspection - Bilateral - No Cyanotic nailbeds or Digital clubbing. Lower Extremity:   Palpation: Edema - Bilateral - No edema. Abdomen:   Soft, non-tender, bowel sounds are active.   Neuro: A&O times 3, CN and motor grossly WNL    Labs:   Lab Results   Component Value Date/Time    Cholesterol, total 252 (H) 07/29/2019 09:55 AM    Cholesterol, total 197 07/26/2018 10:19 AM    Cholesterol, total 237 (H) 05/24/2017 08:24 AM    Cholesterol, total 261 (H) 03/29/2017 08:20 AM    Cholesterol, total 242 (H) 03/15/2016 10:36 AM    HDL Cholesterol 71 07/29/2019 09:55 AM    HDL Cholesterol 61 07/26/2018 10:19 AM    HDL Cholesterol 71 05/24/2017 08:24 AM    HDL Cholesterol 58 03/29/2017 08:20 AM    HDL Cholesterol 66 03/15/2016 10:36 AM    LDL, calculated 172 (H) 07/29/2019 09:55 AM    LDL, calculated 128 (H) 07/26/2018 10:19 AM    LDL, calculated 158 (H) 05/24/2017 08:24 AM    LDL, calculated 191 (H) 03/29/2017 08:20 AM    LDL, calculated 166 (H) 03/15/2016 10:36 AM    Triglyceride 43 07/29/2019 09:55 AM    Triglyceride 42 07/26/2018 10:19 AM    Triglyceride 39 05/24/2017 08:24 AM    Triglyceride 62 03/29/2017 08:20 AM    Triglyceride 49 03/15/2016 10:36 AM    CHOL/HDL Ratio 3.7 11/11/2010 10:33 AM    CHOL/HDL Ratio 3.5 10/15/2009 07:53 AM     Lab Results   Component Value Date/Time     03/08/2012 10:12 AM     Lab Results   Component Value Date/Time    Sodium 142 07/29/2019 09:55 AM    Potassium 4.3 07/29/2019 09:55 AM    Chloride 104 07/29/2019 09:55 AM    CO2 24 07/29/2019 09:55 AM    Anion gap 6 03/08/2012 04:45 AM    Glucose 89 07/29/2019 09:55 AM    BUN 12 07/29/2019 09:55 AM    Creatinine 0.76 07/29/2019 09:55 AM    BUN/Creatinine ratio 16 07/29/2019 09:55 AM    GFR est  07/29/2019 09:55 AM    GFR est non-AA 87 07/29/2019 09:55 AM    Calcium 9.4 07/29/2019 09:55 AM    Bilirubin, total 0.5 07/29/2019 09:55 AM    Alk. phosphatase 71 07/29/2019 09:55 AM    Protein, total 7.1 07/29/2019 09:55 AM    Albumin 4.3 07/29/2019 09:55 AM    Globulin 3.8 03/08/2012 04:45 AM    A-G Ratio 1.5 07/29/2019 09:55 AM    ALT (SGPT) 10 07/29/2019 09:55 AM       EKG:  NSR      Assessment:     Assessment:      1. Atypical chest pain    2. Mixed hyperlipidemia    3.  Essential hypertension 4. Agatston CAC score, <100    5. Mitral valve insufficiency, unspecified etiology        Orders Placed This Encounter    AMB POC EKG ROUTINE W/ 12 LEADS, INTER & REP     Order Specific Question:   Reason for Exam:     Answer:   routine        Plan:     Atypical cp  Agatston CAC score of 0 in 9/2014  Normal stress echo in 10/12  Repeat stress echo  If stress echo is negative, she is interested in repeating coronary calcium score. Coronary calcium scoring would be reassuring if totally normal and threshold for further testing/treatment would be decreased if high- the patient wishes to proceed. If calcium score remains nearing 0, may continue with aggressive efforts at healthy diet and exercise, if elevating significantly with repeat lipids in the near future still high, stronger consideration for statin. Mitrall Regurg  Normal EF mild MR per echo in 11/11  Repeat echo      HTN  Home -130s / 70s-80s  Continue current care      HLD  7/19    Previously on pravastatin Lipids and labs followed by PCP. Follow-up to be determined based on test results. Likely 1 year if testing unremarkable.       Ligia Del Valle MD

## 2020-09-28 ENCOUNTER — OFFICE VISIT (OUTPATIENT)
Dept: CARDIOLOGY CLINIC | Age: 60
End: 2020-09-28
Payer: COMMERCIAL

## 2020-09-28 VITALS
WEIGHT: 206 LBS | BODY MASS INDEX: 31.22 KG/M2 | DIASTOLIC BLOOD PRESSURE: 86 MMHG | HEIGHT: 68 IN | SYSTOLIC BLOOD PRESSURE: 146 MMHG | RESPIRATION RATE: 18 BRPM | HEART RATE: 83 BPM | OXYGEN SATURATION: 97 %

## 2020-09-28 DIAGNOSIS — I10 ESSENTIAL HYPERTENSION: ICD-10-CM

## 2020-09-28 DIAGNOSIS — R93.1 AGATSTON CAC SCORE, <100: ICD-10-CM

## 2020-09-28 DIAGNOSIS — E78.2 MIXED HYPERLIPIDEMIA: ICD-10-CM

## 2020-09-28 DIAGNOSIS — I34.0 MITRAL VALVE INSUFFICIENCY, UNSPECIFIED ETIOLOGY: ICD-10-CM

## 2020-09-28 DIAGNOSIS — R07.89 ATYPICAL CHEST PAIN: Primary | ICD-10-CM

## 2020-09-28 DIAGNOSIS — R07.89 ATYPICAL CHEST PAIN: ICD-10-CM

## 2020-09-28 PROCEDURE — 99214 OFFICE O/P EST MOD 30 MIN: CPT | Performed by: INTERNAL MEDICINE

## 2020-09-28 PROCEDURE — 93000 ELECTROCARDIOGRAM COMPLETE: CPT | Performed by: INTERNAL MEDICINE

## 2020-09-28 NOTE — PROGRESS NOTES
Chief Complaint   Patient presents with    New Patient     Last seen here - Dr Rigo Mendosa 4/2017    Chest Pain     soreness/pressure at sturmu area  for one month - ? bruised sternum - feels it may have happened while having sexual intercourse      1. Have you been to the ER, urgent care clinic since your last visit? Hospitalized since your last visit? No     2. Have you seen or consulted any other health care providers outside of the [address] [place] [address] since your last visit? Include any pap smears or colon screening.   No

## 2020-09-29 DIAGNOSIS — R93.1 AGATSTON CAC SCORE, <100: ICD-10-CM

## 2020-09-29 DIAGNOSIS — I10 ESSENTIAL HYPERTENSION: ICD-10-CM

## 2020-09-29 DIAGNOSIS — I34.0 MITRAL VALVE INSUFFICIENCY, UNSPECIFIED ETIOLOGY: ICD-10-CM

## 2020-09-29 DIAGNOSIS — E78.2 MIXED HYPERLIPIDEMIA: ICD-10-CM

## 2020-09-29 DIAGNOSIS — R07.89 ATYPICAL CHEST PAIN: ICD-10-CM

## 2020-10-01 ENCOUNTER — TRANSCRIBE ORDER (OUTPATIENT)
Dept: SCHEDULING | Age: 60
End: 2020-10-01

## 2020-10-01 DIAGNOSIS — Z00.00 ROUTINE CHECK-UP: Primary | ICD-10-CM

## 2020-10-06 ENCOUNTER — OFFICE VISIT (OUTPATIENT)
Dept: URGENT CARE | Age: 60
End: 2020-10-06

## 2020-10-06 VITALS — TEMPERATURE: 98.8 F | HEART RATE: 95 BPM | OXYGEN SATURATION: 97 % | RESPIRATION RATE: 14 BRPM

## 2020-10-06 DIAGNOSIS — Z20.822 ENCOUNTER FOR LABORATORY TESTING FOR COVID-19 VIRUS: Primary | ICD-10-CM

## 2020-10-08 LAB — SARS-COV-2, NAA: NOT DETECTED

## 2020-10-12 ENCOUNTER — ANCILLARY PROCEDURE (OUTPATIENT)
Dept: CARDIOLOGY CLINIC | Age: 60
End: 2020-10-12
Payer: COMMERCIAL

## 2020-10-12 VITALS
WEIGHT: 206 LBS | SYSTOLIC BLOOD PRESSURE: 170 MMHG | BODY MASS INDEX: 31.22 KG/M2 | HEIGHT: 68 IN | DIASTOLIC BLOOD PRESSURE: 90 MMHG

## 2020-10-12 DIAGNOSIS — R07.89 ATYPICAL CHEST PAIN: ICD-10-CM

## 2020-10-12 DIAGNOSIS — R07.89 ATYPICAL CHEST PAIN: Primary | ICD-10-CM

## 2020-10-12 PROCEDURE — 93015 CV STRESS TEST SUPVJ I&R: CPT | Performed by: INTERNAL MEDICINE

## 2020-10-13 ENCOUNTER — ANCILLARY PROCEDURE (OUTPATIENT)
Dept: CARDIOLOGY CLINIC | Age: 60
End: 2020-10-13
Payer: COMMERCIAL

## 2020-10-13 VITALS
WEIGHT: 206 LBS | DIASTOLIC BLOOD PRESSURE: 90 MMHG | HEIGHT: 68 IN | BODY MASS INDEX: 31.22 KG/M2 | SYSTOLIC BLOOD PRESSURE: 170 MMHG

## 2020-10-13 PROCEDURE — 93306 TTE W/DOPPLER COMPLETE: CPT | Performed by: INTERNAL MEDICINE

## 2020-10-14 ENCOUNTER — TELEPHONE (OUTPATIENT)
Dept: CARDIOLOGY CLINIC | Age: 60
End: 2020-10-14

## 2020-10-14 LAB
ECHO AO ASC DIAM: 3.34 CM
ECHO AO ROOT DIAM: 3.41 CM
ECHO AV PEAK GRADIENT: 9.58 MMHG
ECHO AV PEAK VELOCITY: 154.72 CM/S
ECHO EST RA PRESSURE: 3 MMHG
ECHO LA AREA 4C: 19.05 CM2
ECHO LA MAJOR AXIS: 3.77 CM
ECHO LA MINOR AXIS: 1.82 CM
ECHO LA VOL 2C: 44.24 ML (ref 22–52)
ECHO LA VOL 4C: 52.63 ML (ref 22–52)
ECHO LA VOL BP: 50.89 ML (ref 22–52)
ECHO LA VOL/BSA BIPLANE: 24.59 ML/M2 (ref 16–28)
ECHO LA VOLUME INDEX A2C: 21.37 ML/M2 (ref 16–28)
ECHO LA VOLUME INDEX A4C: 25.43 ML/M2 (ref 16–28)
ECHO LV E' LATERAL VELOCITY: 12.09 CM/S
ECHO LV E' SEPTAL VELOCITY: 8.33 CM/S
ECHO LV INTERNAL DIMENSION DIASTOLIC: 4.92 CM (ref 3.9–5.3)
ECHO LV INTERNAL DIMENSION SYSTOLIC: 2.22 CM
ECHO LV ISOVOLUMETRIC RELAXATION TIME (IVRT): 0.07 S
ECHO LV IVSD: 0.98 CM (ref 0.6–0.9)
ECHO LV MASS 2D: 176 G (ref 67–162)
ECHO LV MASS INDEX 2D: 85 G/M2 (ref 43–95)
ECHO LV POSTERIOR WALL DIASTOLIC: 1.01 CM (ref 0.6–0.9)
ECHO LVOT PEAK GRADIENT: 5.54 MMHG
ECHO LVOT PEAK VELOCITY: 117.68 CM/S
ECHO MV "A" WAVE DURATION: 0.14 S
ECHO MV A VELOCITY: 84.9 CM/S
ECHO MV AREA PHT: 4.83 CM2
ECHO MV E DECELERATION TIME (DT): 0.16 S
ECHO MV E VELOCITY: 55.08 CM/S
ECHO MV E/A RATIO: 0.65
ECHO MV E/E' LATERAL: 4.56
ECHO MV E/E' RATIO (AVERAGED): 5.58
ECHO MV E/E' SEPTAL: 6.61
ECHO MV PRESSURE HALF TIME (PHT): 0.05 S
ECHO PVEIN A DURATION: 0.1 S
ECHO PVEIN A VELOCITY: 37.55 CM/S
ECHO RIGHT VENTRICULAR SYSTOLIC PRESSURE (RVSP): 20.02 MMHG
ECHO RV TAPSE: 2.48 CM (ref 1.5–2)
ECHO TV REGURGITANT MAX VELOCITY: 206.3 CM/S
ECHO TV REGURGITANT PEAK GRADIENT: 17.02 MMHG
STRESS ANGINA INDEX: 0
STRESS BASELINE DIAS BP: 90 MMHG
STRESS BASELINE HR: 104 BPM
STRESS BASELINE SYS BP: 170 MMHG
STRESS ESTIMATED WORKLOAD: 11.7 METS
STRESS EXERCISE DUR MIN: NORMAL MIN:SEC
STRESS O2 SAT PEAK: 98 %
STRESS O2 SAT REST: 100 %
STRESS PEAK DIAS BP: 80 MMHG
STRESS PEAK SYS BP: 205 MMHG
STRESS PERCENT HR ACHIEVED: 101 %
STRESS POST PEAK HR: 162 BPM
STRESS RATE PRESSURE PRODUCT: NORMAL BPM*MMHG
STRESS SR DUKE TREADMILL SCORE: 10
STRESS ST DEPRESSION: 0 MM
STRESS ST ELEVATION: 0 MM
STRESS STAGE 1 BP: NORMAL MMHG
STRESS STAGE 1 DURATION: 3 MIN:SEC
STRESS STAGE 1 HR: 133 BPM
STRESS STAGE 2 BP: NORMAL MMHG
STRESS STAGE 2 DURATION: 3 MIN:SEC
STRESS STAGE 2 HR: 139 BPM
STRESS STAGE 3 BP: NORMAL MMHG
STRESS STAGE 3 DURATION: 3 MIN:SEC
STRESS STAGE 3 HR: 155 BPM
STRESS STAGE 4 DURATION: 1 MIN:SEC
STRESS STAGE 4 HR: 162 BPM
STRESS STAGE RECOVERY 1 BP: NORMAL MMHG
STRESS STAGE RECOVERY 1 DURATION: 7 MIN:SEC
STRESS STAGE RECOVERY 1 HR: 106 BPM
STRESS TARGET HR: 161 BPM

## 2020-10-14 NOTE — TELEPHONE ENCOUNTER
----- Message from Michelle Mendoza NP sent at 10/14/2020  1:09 PM EDT -----  Stress test normal with good exercise tolerance

## 2020-10-14 NOTE — TELEPHONE ENCOUNTER
Spoke with patient. Verified patient with two patient identifiers. Advised EST normal.  Patient verbalized understanding.

## 2020-10-14 NOTE — TELEPHONE ENCOUNTER
----- Message from Jennifer Alberts NP sent at 10/14/2020  1:15 PM EDT -----  Normal pumping heart strength, valves ok.

## 2020-10-21 ENCOUNTER — TELEPHONE (OUTPATIENT)
Dept: CARDIOLOGY CLINIC | Age: 60
End: 2020-10-21

## 2020-10-21 ENCOUNTER — HOSPITAL ENCOUNTER (OUTPATIENT)
Dept: CT IMAGING | Age: 60
Discharge: HOME | End: 2020-10-21
Attending: INTERNAL MEDICINE

## 2020-10-21 PROCEDURE — 75571 CT HRT W/O DYE W/CA TEST: CPT

## 2020-10-21 NOTE — TELEPHONE ENCOUNTER
----- Message from Mustapha Hays NP sent at 10/21/2020 10:30 AM EDT -----  Mild plaque build up in her coronary arteries. Continue exercise, low fat / heart healthy diet. Called pt,verified pt with two pt identifiers, advised pt her ct calcium score shows mild plaque build up in her coronary arteries. Advised her score is 11, went over the score ranges. Advised her to continue to exercise, low fat, heart healthy diet. Pt verbalized understanding and had no further questions.

## 2020-12-17 RX ORDER — AMLODIPINE BESYLATE 10 MG/1
TABLET ORAL
Qty: 30 TAB | Refills: 0 | Status: SHIPPED | OUTPATIENT
Start: 2020-12-17 | End: 2021-02-24 | Stop reason: SDUPTHER

## 2020-12-17 NOTE — TELEPHONE ENCOUNTER
PCP: Sabrina Bruno MD    Last appt: Visit date not found  Future Appointments   Date Time Provider Abel Acosta   12/24/2020  8:00 AM LAB [facility] BS AMB   12/24/2020  9:00 AM Mary Alice Laboy MD [facility] BS AMB       Requested Prescriptions     Pending Prescriptions Disp Refills    amLODIPine (NORVASC) 10 mg tablet 30 Tab 0     Sig: TAKE 1 TABLET BY [address] [facility]    Patient has 0 days' supply of medication available.     Prior labs and Blood pressures:  BP Readings from Last 3 Encounters:   10/13/20 (!) 170/90   10/12/20 (!) 170/90   09/28/20 (!) 146/86     Lab Results   Component Value Date/Time    Sodium 142 07/29/2019 09:55 AM    Potassium 4.3 07/29/2019 09:55 AM    Chloride 104 07/29/2019 09:55 AM    CO2 24 07/29/2019 09:55 AM    Anion gap 6 03/08/2012 04:45 AM    Glucose 89 07/29/2019 09:55 AM    BUN 12 07/29/2019 09:55 AM    Creatinine 0.76 07/29/2019 09:55 AM    BUN/Creatinine ratio 16 07/29/2019 09:55 AM    GFR est  07/29/2019 09:55 AM    GFR est non-AA 87 07/29/2019 09:55 AM    Calcium 9.4 07/29/2019 09:55 AM     No results found for: HBA1C, HGBE8, YCQ8VNZG, PNI8MHGY  Lab Results   Component Value Date/Time    Cholesterol, total 252 (H) 07/29/2019 09:55 AM    HDL Cholesterol 71 07/29/2019 09:55 AM    LDL, calculated 172 (H) 07/29/2019 09:55 AM    VLDL, calculated 9 07/29/2019 09:55 AM    Triglyceride 43 07/29/2019 09:55 AM    CHOL/HDL Ratio 3.7 11/11/2010 10:33 AM     Lab Results   Component Value Date/Time    Vitamin D 25-Hydroxy 28.3 (L) 09/13/2011 09:27 AM    VITAMIN D, 25-HYDROXY 42.4 07/29/2019 09:55 AM       Lab Results   Component Value Date/Time    TSH 1.330 07/29/2019 09:55 AM

## 2020-12-21 RX ORDER — AMLODIPINE BESYLATE 10 MG/1
TABLET ORAL
Qty: 30 TAB | Refills: 0 | OUTPATIENT
Start: 2020-12-21

## 2020-12-24 ENCOUNTER — VIRTUAL VISIT (OUTPATIENT)
Dept: FAMILY MEDICINE CLINIC | Age: 60
End: 2020-12-24
Payer: COMMERCIAL

## 2020-12-24 DIAGNOSIS — M54.50 ACUTE RIGHT-SIDED LOW BACK PAIN WITHOUT SCIATICA: Primary | ICD-10-CM

## 2020-12-24 LAB
25(OH)D3 SERPL-MCNC: 44.8 NG/ML (ref 30–100)
ALBUMIN SERPL-MCNC: 4 G/DL (ref 3.5–5)
ALBUMIN/GLOB SERPL: 1.1 {RATIO} (ref 1.1–2.2)
ALP SERPL-CCNC: 91 U/L (ref 45–117)
ALT SERPL-CCNC: 16 U/L (ref 12–78)
ANION GAP SERPL CALC-SCNC: 5 MMOL/L (ref 5–15)
APPEARANCE UR: ABNORMAL
AST SERPL-CCNC: 19 U/L (ref 15–37)
BACTERIA URNS QL MICRO: NEGATIVE /HPF
BASOPHILS # BLD: 0 K/UL (ref 0–0.1)
BASOPHILS NFR BLD: 1 % (ref 0–1)
BILIRUB SERPL-MCNC: 0.7 MG/DL (ref 0.2–1)
BILIRUB UR QL: NEGATIVE
BUN SERPL-MCNC: 13 MG/DL (ref 6–20)
BUN/CREAT SERPL: 15 (ref 12–20)
CALCIUM SERPL-MCNC: 9.1 MG/DL (ref 8.5–10.1)
CHLORIDE SERPL-SCNC: 108 MMOL/L (ref 97–108)
CHOLEST SERPL-MCNC: 294 MG/DL
CO2 SERPL-SCNC: 29 MMOL/L (ref 21–32)
COLOR UR: ABNORMAL
CREAT SERPL-MCNC: 0.85 MG/DL (ref 0.55–1.02)
DIFFERENTIAL METHOD BLD: ABNORMAL
EOSINOPHIL # BLD: 0.3 K/UL (ref 0–0.4)
EOSINOPHIL NFR BLD: 5 % (ref 0–7)
EPITH CASTS URNS QL MICRO: ABNORMAL /LPF
ERYTHROCYTE [DISTWIDTH] IN BLOOD BY AUTOMATED COUNT: 14.1 % (ref 11.5–14.5)
GLOBULIN SER CALC-MCNC: 3.8 G/DL (ref 2–4)
GLUCOSE SERPL-MCNC: 88 MG/DL (ref 65–100)
GLUCOSE UR STRIP.AUTO-MCNC: NEGATIVE MG/DL
HCT VFR BLD AUTO: 44.4 % (ref 35–47)
HDLC SERPL-MCNC: 84 MG/DL
HDLC SERPL: 3.5 {RATIO} (ref 0–5)
HGB BLD-MCNC: 13.9 G/DL (ref 11.5–16)
HGB UR QL STRIP: NEGATIVE
HYALINE CASTS URNS QL MICRO: ABNORMAL /LPF (ref 0–5)
IMM GRANULOCYTES # BLD AUTO: 0 K/UL (ref 0–0.04)
IMM GRANULOCYTES NFR BLD AUTO: 0 % (ref 0–0.5)
KETONES UR QL STRIP.AUTO: NEGATIVE MG/DL
LDLC SERPL CALC-MCNC: 200.6 MG/DL (ref 0–100)
LEUKOCYTE ESTERASE UR QL STRIP.AUTO: ABNORMAL
LIPID PROFILE,FLP: ABNORMAL
LYMPHOCYTES # BLD: 2 K/UL (ref 0.8–3.5)
LYMPHOCYTES NFR BLD: 42 % (ref 12–49)
MCH RBC QN AUTO: 26.1 PG (ref 26–34)
MCHC RBC AUTO-ENTMCNC: 31.3 G/DL (ref 30–36.5)
MCV RBC AUTO: 83.3 FL (ref 80–99)
MONOCYTES # BLD: 0.5 K/UL (ref 0–1)
MONOCYTES NFR BLD: 10 % (ref 5–13)
NEUTS SEG # BLD: 2 K/UL (ref 1.8–8)
NEUTS SEG NFR BLD: 42 % (ref 32–75)
NITRITE UR QL STRIP.AUTO: NEGATIVE
NRBC # BLD: 0 K/UL (ref 0–0.01)
NRBC BLD-RTO: 0 PER 100 WBC
PH UR STRIP: 7 [PH] (ref 5–8)
PLATELET # BLD AUTO: 233 K/UL (ref 150–400)
PMV BLD AUTO: 12.4 FL (ref 8.9–12.9)
POTASSIUM SERPL-SCNC: 4 MMOL/L (ref 3.5–5.1)
PROT SERPL-MCNC: 7.8 G/DL (ref 6.4–8.2)
PROT UR STRIP-MCNC: NEGATIVE MG/DL
RBC # BLD AUTO: 5.33 M/UL (ref 3.8–5.2)
RBC #/AREA URNS HPF: ABNORMAL /HPF (ref 0–5)
SODIUM SERPL-SCNC: 142 MMOL/L (ref 136–145)
SP GR UR REFRACTOMETRY: 1.02 (ref 1–1.03)
TRIGL SERPL-MCNC: 47 MG/DL (ref ?–150)
TSH SERPL DL<=0.05 MIU/L-ACNC: 1.47 UIU/ML (ref 0.36–3.74)
UROBILINOGEN UR QL STRIP.AUTO: 1 EU/DL (ref 0.2–1)
VLDLC SERPL CALC-MCNC: 9.4 MG/DL
WBC # BLD AUTO: 4.8 K/UL (ref 3.6–11)
WBC URNS QL MICRO: ABNORMAL /HPF (ref 0–4)

## 2020-12-24 PROCEDURE — 99212 OFFICE O/P EST SF 10 MIN: CPT | Performed by: FAMILY MEDICINE

## 2020-12-24 NOTE — PROGRESS NOTES
**THIS IS A VIRTUAL VISIT VIA A VIDEO SYNCHRONOUS DISCUSSION OVER DOXY. ME PATIENT AGREED TO HAVE THEIR CARE DELIVERED OVER A [facility] VIDEO VISIT IN PLACE OF THEIR REGULARLY SCHEDULED OFFICE VISIT**       Carmelo Snyder is a 62 y.o. female who was seen by synchronous (real-time) audio-video technology on 12/24/2020 for LOW BACK PAIN (pt states, x 1 week. aggravating pain not constant. no hematuria. no sx of UTI. no tingling or numbness sensation.)        Assessment & Plan:   Diagnoses and all orders for this visit:    1. Acute right-sided low back pain without sciatica            Subjective:     [place] shopping. Thinks sxs with working out picking up weights. Sxs 1-2 days later. Right side top of buttock. Nothing down leg. Using CBD cream.  Discussed modalities. To PT if not improving or worse. Declines muscle relaxer. Prior to Admission medications    Medication Sig Start Date End Date Taking? Authorizing Provider   amLODIPine (NORVASC) 10 mg tablet TAKE 1 TABLET BY MOUTH EVERY DAY 12/17/20  Yes Xiomy Laboy MD   ibuprofen (MOTRIN) 200 mg tablet Take 200 mg by mouth. Yes Provider, Historical     Patient Active Problem List   Diagnosis Code    Mixed hyperlipidemia E78.2    Essential hypertension I10    GERD (gastroesophageal reflux disease) K21.9    Vitamin D deficiency E55.9    FH: colonic polyps Z83.71    Right lateral epicondylitis M77.11    Ischial bursitis of right side M70.71     Current Outpatient Medications   Medication Sig Dispense Refill    amLODIPine (NORVASC) 10 mg tablet TAKE 1 TABLET BY MOUTH EVERY DAY 30 Tab 0    ibuprofen (MOTRIN) 200 mg tablet Take 200 mg by mouth.        Allergies   Allergen Reactions    Contrast Agent [Iodine] Other (comments)     Increased heart rate     Past Medical History:   Diagnosis Date    Advance directive discussed with patient 03/15/2016    Anemia NEC     shwetha heather   2/11/16 f/u note    Atypical chest pain     dr Aroldo Hicks [phone]    Back pain 03/2016    Colon polyp 11/03/2016    bx pending but report says 5 yr repeat    Dysphagia 2/24/14    notes from [address] EGD planned    Elevated lipids     dr Rosita Benítez [phone]    Eustachian tube dysfunction 01/10/2017    notes from [facility]  4/27/17    Fibrocystic breast disease     Fractured tarsal bone 10/2013    pt first    GERD (gastroesophageal reflux disease)     Head ache 2/9/15    tension headache per Pt First note    Headache(784.0) 11/2009    dr Dayana Obando Hypercholesterolemia     Hypertension     Incontinence of urine     Labyrinthitis 01/13/2017    PF Norberto note    Left [facility] 7/3013 [facility]    PPD positive, treated     INH but not sure length of tx    Screening for viral disease 12/3/15    titers neg for Hep A-B and C thru GYN    Tension type headache     Viral gastroenteritis [id number]    kilo notes     Past Surgical History:   Procedure Laterality Date    CT HEART W/O CONT WITH CALCIUM  3/2006    score 4.81    EGD  3/2002    dr Jessica Rowell for Barretts    HX COLONOSCOPY  11/03/2016    5 yr recall-1 polyp removed    HX ORTHOPAEDIC  9/2010    torn meniscus dr aguero    HX ORTHOPAEDIC  1/18/10    ganglion cyst dr Jacey Troncoso    [facility]  3/17/14    repeat EGD Dr Beny Cormier FLX DX W/COLLJ [place] 1977 PFRMD  10/3/2011          Family History   Problem Relation Age of Onset    Cancer Father         pancreatic    Cancer Maternal Aunt      Social History     Tobacco Use    Smoking status: Never Smoker    Smokeless tobacco: Never Used   Substance Use Topics    Alcohol use: Yes     Comment: wine occassional       ROS    Objective:     Patient-Reported Vitals 12/24/2020   Patient-Reported LMP post menopause        [INSTRUCTIONS:  \"[x]\" Indicates a positive item  \"[]\" Indicates a negative item  -- DELETE ALL ITEMS NOT EXAMINED]    Constitutional: [x] Appears well-developed and well-nourished [x] No apparent distress      [] Abnormal -     Mental status: [x] Alert and awake  [x] Oriented to person/place/time [x] Able to follow commands    [] Abnormal -     Eyes:   EOM    [x]  Normal    [] Abnormal -   Sclera  [x]  Normal    [] Abnormal -          Discharge [x]  None visible   [] Abnormal -     HENT: [x] Normocephalic, atraumatic  [] Abnormal -   [x] Mouth/Throat: Mucous membranes are moist    External Ears [x] Normal  [] Abnormal -    Neck: [x] No visualized mass [] Abnormal -     Pulmonary/Chest: [x] Respiratory effort normal   [x] No visualized signs of difficulty breathing or respiratory distress        [] Abnormal -      Neurological:        [x] No Facial Asymmetry (Cranial nerve 7 motor function) (limited exam due to video visit)          [x] No gaze palsy        [] Abnormal -          Skin:        [x] No significant exanthematous lesions or discoloration noted on facial skin         [] Abnormal -            Psychiatric:       [x] Normal Affect [] Abnormal -        [x] No Hallucinations    Other pertinent observable physical exam findings:-        We discussed the expected course, resolution and complications of the diagnosis(es) in detail. Medication risks, benefits, costs, interactions, and alternatives were discussed as indicated. I advised her to contact the office if her condition worsens, changes or fails to improve as anticipated. She expressed understanding with the diagnosis(es) and plan. Kendall Puckett, who was evaluated through a patient-initiated, synchronous (real-time) audio-video encounter, and/or her healthcare decision maker, is aware that it is a billable service, with coverage as determined by her insurance carrier. She provided verbal consent to proceed: Yes, and patient identification was verified.  It was conducted pursuant to the emergency declaration under the [address] [facility] [address] and Response Supplemental Appropriations Act. A caregiver was present when appropriate. Ability to conduct physical exam was limited. I was at home. The patient was in car.       Jed Ann MD

## 2020-12-24 NOTE — PROGRESS NOTES
VV-Pt is aware of billable appt depending on insurance plan. Preferred number [phone]    Pt verbally agrees to labs, orders, and others to be mailed to confirmed home address. Identified pt with two pt identifiers(name and ). Reviewed record in preparation for visit and have obtained necessary documentation. All patient medications has been reviewed. Chief Complaint   Patient presents with    LOW BACK PAIN     pt states, x 1 week. aggravating pain not constant. no hematuria. no sx of UTI. no tingling or numbness sensation. Pain: 4 (low back)    Health Maintenance Review: Patient reminded of \"due or due soon\" health maintenance. I have asked the patient to contact his/her primary care provider (PCP) for follow-up on his/her health maintenance. Wt Readings from Last 3 Encounters:   10/13/20 206 lb (93.4 kg)   10/12/20 206 lb (93.4 kg)   20 206 lb (93.4 kg)     Temp Readings from Last 3 Encounters:   10/06/20 98.8 °F (37.1 °C)   20 98.2 °F (36.8 °C)   20 96.5 °F (35.8 °C) (Oral)     BP Readings from Last 3 Encounters:   10/13/20 (!) 170/90   10/12/20 (!) 170/90   20 (!) 146/86     Pulse Readings from Last 3 Encounters:   10/06/20 95   20 83   20 86         Coordination of Care Questionnaire:   1) Have you been to an emergency room, urgent care, or hospitalized since your last visit?   no       2. Have seen or consulted any other health care provider since your last visit? NO    Advance Care Planning:   End of Life Planning: has NO advanced directive - not interested in additional information      Patient is accompanied by self I have received verbal consent from Johnson Crump to discuss any/all medical information while they are present in the room.

## 2020-12-28 NOTE — PROGRESS NOTES
Pt made aware of lab results, states she saw her cardiologist 2 mo ago who did CAC , stress test and echo , her calcium score was 11. Pt states her cardiologist stated she was doing well. Informed her that I will send her labs to Dr. Josselyn Moy her cardiologist and to f/u with him and to continue to exercise and maintain a low fat, heart health diet   Pt verbalized understanding of information discussed w/ no further questions at this time.

## 2021-02-24 NOTE — TELEPHONE ENCOUNTER
Last visit 12/24/2020 Virtual visit MD Read   Next appointment Nothing scheduled   Previous refill encounter(s)   12/17/2020 Norvasc #30     Requested Prescriptions     Pending Prescriptions Disp Refills    amLODIPine (NORVASC) 10 mg tablet 30 Tab 0     Sig: Take 1 Tab by mouth daily.

## 2021-03-05 RX ORDER — AMLODIPINE BESYLATE 10 MG/1
10 TABLET ORAL DAILY
Qty: 90 TAB | Refills: 0 | Status: SHIPPED | OUTPATIENT
Start: 2021-03-05 | End: 2021-06-18 | Stop reason: SDUPTHER

## 2021-03-22 ENCOUNTER — TELEPHONE (OUTPATIENT)
Dept: CARDIOLOGY CLINIC | Age: 61
End: 2021-03-22

## 2021-03-22 NOTE — TELEPHONE ENCOUNTER
Patient has been C/O nagging pain behind left knee and calf areas for 1 week not constant continues to work out cardio,strengthening  and squats. She noted /102 seemed anxious and had been using some sodium  tried to relax and rechecked later 130/70  Pulse 80 BPM she is concerned about blood clots. Recommend ortho or UC to rule this out .  Please advise further her cardiac workup in 10/20 was good

## 2021-03-22 NOTE — TELEPHONE ENCOUNTER
Please call patient is it possible to have blood clots without symptoms. Nagging pain not constant been going on for about a week.           [phone]    Thanks  Armando Strauss

## 2021-05-12 ENCOUNTER — OFFICE VISIT (OUTPATIENT)
Dept: INTERNAL MEDICINE CLINIC | Age: 61
End: 2021-05-12
Payer: COMMERCIAL

## 2021-05-12 VITALS
SYSTOLIC BLOOD PRESSURE: 122 MMHG | BODY MASS INDEX: 30.01 KG/M2 | HEIGHT: 68 IN | DIASTOLIC BLOOD PRESSURE: 80 MMHG | HEART RATE: 86 BPM | TEMPERATURE: 97.6 F | OXYGEN SATURATION: 98 % | RESPIRATION RATE: 14 BRPM | WEIGHT: 198 LBS

## 2021-05-12 DIAGNOSIS — E78.2 MIXED HYPERLIPIDEMIA: ICD-10-CM

## 2021-05-12 DIAGNOSIS — Z78.9 STATIN INTOLERANCE: ICD-10-CM

## 2021-05-12 DIAGNOSIS — I10 ESSENTIAL HYPERTENSION: Primary | ICD-10-CM

## 2021-05-12 DIAGNOSIS — R93.1 AGATSTON CORONARY ARTERY CALCIUM SCORE LESS THAN 100: ICD-10-CM

## 2021-05-12 DIAGNOSIS — K21.9 GASTROESOPHAGEAL REFLUX DISEASE WITHOUT ESOPHAGITIS: ICD-10-CM

## 2021-05-12 PROCEDURE — 99214 OFFICE O/P EST MOD 30 MIN: CPT | Performed by: INTERNAL MEDICINE

## 2021-05-12 RX ORDER — ESOMEPRAZOLE MAGNESIUM 40 MG/1
CAPSULE, DELAYED RELEASE ORAL
COMMUNITY
Start: 2021-04-27

## 2021-05-12 RX ORDER — BISMUTH SUBSALICYLATE 262 MG
1 TABLET,CHEWABLE ORAL DAILY
COMMUNITY

## 2021-05-12 NOTE — PROGRESS NOTES
Izaiah Meeks is a 62 y.o. female presenting for Establish Care  . 1. Have you been to the ER, urgent care clinic since your last visit? Hospitalized since your last visit? Yes When: 3/2021 [facility] Reason for visit: chest pressure    2. Have you seen or consulted any other health care providers outside of the [address] [place] [address] since your last visit? Include any pap smears or colon screening. No    Fall Risk Assessment, last 12 mths 5/12/2021   Able to walk? Yes   Fall in past 12 months? 0   Do you feel unsteady? 0   Are you worried about falling 0         Abuse Screening Questionnaire 5/12/2021   Do you ever feel afraid of your partner? N   Are you in a relationship with someone who physically or mentally threatens you? N   Is it safe for you to go home? Y       3 most recent PHQ Screens 5/12/2021   Little interest or pleasure in doing things Not at all   Feeling down, depressed, irritable, or hopeless Not at all   Total Score PHQ 2 0       There are no discontinued medications.

## 2021-05-12 NOTE — PROGRESS NOTES
Elkin Carrera is a 62 y.o. female and presents with Establish Care      Subjective:  She comes in today to establish care. She works in the [employer]. She is  and has 2 kids. Hypertensionwell-controlled on current meds. Hyperlipidemianot on statin. . She wanted to try lifestyle modification. LDL has been creeping up. She did have a negative stress echo . alcium score was 11. Following up with cardiology. Echonormal EF, mild MR. History of GERD on PPI. Did have endoscopy few years back for difficulty swallowing and heartburn. Denies any symptoms today. Symptoms well controlled on PPI.     Past Medical History:   Diagnosis Date    Advance directive discussed with patient 03/15/2016    Anemia NEC     shwetha romero   2/11/16 f/u note    Atypical chest pain     dr Ivette Forde [phone]    Back pain 03/2016    Colon polyp 11/03/2016    bx pending but report says 5 yr repeat    Dysphagia 2/24/14    notes from [facility] EGD planned    Elevated lipids     dr Tomas Oneil [phone]    Eustachian tube dysfunction 01/10/2017    notes from [facility]  4/27/17    Fibrocystic breast disease     Fractured tarsal bone 10/2013    pt first    GERD (gastroesophageal reflux disease)     Head ache 2/9/15    tension headache per Pt First note    Headache(784.0) 11/2009    dr Kei Caldera Hypercholesterolemia     Hypertension     Incontinence of urine     Labyrinthitis 01/13/2017    [facility] note    Left knee pain 7/3013    [facility]    PPD positive, treated     INH but not sure length of tx    Screening for viral disease 12/3/15    titers neg for Hep A-B and C thru GYN    Tension type headache     Viral gastroenteritis [id number]    kilo notes     Past Surgical History:   Procedure Laterality Date    CT HEART W/O CONT WITH CALCIUM  3/2006    score 4.81    EGD  3/2002    dr Cyndi Ken for [place]    HX COLONOSCOPY  11/03/2016 5 yr recall-1 polyp removed    HX ORTHOPAEDIC  9/2010    torn meniscus dr aguero    HX ORTHOPAEDIC  1/18/10    ganglion cyst dr Marie Cos OTHER SURGICAL  3/17/14    repeat EGD Dr James Kuhn    AL COLONOSCOPY FLX DX W/COLLJ [address] [facility] [address] WHEN PFRMD  10/3/2011          Allergies   Allergen Reactions    Contrast Agent [Iodine] Other (comments)     Increased heart rate     Current Outpatient Medications   Medication Sig Dispense Refill    esomeprazole (NEXIUM) 40 mg capsule       multivitamin (ONE A DAY) tablet Take 1 Tab by mouth daily.  amLODIPine (NORVASC) 10 mg tablet Take 1 Tab by mouth daily. 90 Tab 0    ibuprofen (MOTRIN) 200 mg tablet Take 200 mg by mouth.        Social History     Socioeconomic History    Marital status:      Spouse name: Not on file    Number of children: Not on file    Years of education: Not on file    Highest education level: Not on file   Tobacco Use    Smoking status: Never Smoker    Smokeless tobacco: Never Used   Substance and Sexual Activity    Alcohol use: Not Currently    Drug use: No    Sexual activity: Yes     Partners: Male     Birth control/protection: None     Family History   Problem Relation Age of Onset    Cancer Father         pancreatic    Cancer Maternal Aunt        Objective:  Visit Vitals  /80 (BP 1 Location: Left upper arm, BP Patient Position: Sitting, BP Cuff Size: Adult)   Pulse 86   Temp 97.6 °F (36.4 °C)   Resp 14   Ht 5' 8\" (1.727 m)   Wt 198 lb (89.8 kg)   SpO2 98%   BMI 30.11 kg/m²     Physical Exam:   General appearance - alert, well appearing, and in no distress  Mental status - alert, oriented to person, place, and time  EYE-BRANDI, EOMI, fundi normal, corneas normal, no foreign bodies  ENT-ENT exam normal, no neck nodes or sinus tenderness  Nose - normal and patent, no erythema, discharge or polyps  Mouth - mucous membranes moist, pharynx normal without lesions  Neck - supple, no significant adenopathy   Chest - clear to auscultation, no wheezes, rales or rhonchi, symmetric air entry   Heart - normal rate, regular rhythm, normal S1, S2, no murmurs, rubs, clicks or gallops   Abdomen - soft, nontender, nondistended, no masses or organomegaly  Lymph- no adenopathy palpable  Ext-peripheral pulses normal, no pedal edema, no clubbing or cyanosis  Skin-Warm and dry. no hyperpigmentation, vitiligo, or suspicious lesions  Neuro -alert, oriented, normal speech, no focal findings or movement disorder noted  Musculoskeletal- FROM, no bony abnormalities, no point tenderness    Lab Review:  Results for orders placed or performed in visit on 12/24/20   URINALYSIS W/MICROSCOPIC   Result Value Ref Range    Color YELLOW/STRAW      Appearance CLOUDY (A) CLEAR      Specific gravity 1.021 1.003 - 1.030      pH (UA) 7.0 5.0 - 8.0      Protein Negative Negative mg/dL    Glucose Negative Negative mg/dL    Ketone Negative Negative mg/dL    Bilirubin Negative Negative      Blood Negative Negative      Urobilinogen 1.0 0.2 - 1.0 EU/dL    Nitrites Negative Negative      Leukocyte Esterase TRACE (A) Negative      WBC 5-10 0 - 4 /hpf    RBC 0-5 0 - 5 /hpf    Epithelial cells MANY (A) FEW /lpf    Bacteria Negative Negative /hpf    Hyaline cast 0-2 0 - 5 /lpf        Documenation Review:    Assessment/Plan:    Diagnoses and all orders for this visit:    1. Essential hypertension  -     CBC W/O DIFF; Future  -     METABOLIC PANEL, COMPREHENSIVE; Future    2. Mixed hyperlipidemia  -     LIPID PANEL; Future    3. Gastroesophageal reflux disease without esophagitis    4. Statin intolerance    5. Agatston coronary artery calcium score less than 100      Discussed at length with patient. She was on pravastatin few years back but quit taking it because she was scared of the side effects. Discussed about statin in length with patient, mechanics of action, risk versus benefit of the medication.   We will check a lipid panel tomorrow morning along with CBC and CMP depending on the numbers will initiate a Crestor. She voiced understanding. Continue PPI for now. Continue current meds. I will call with lab results and make further recommendations or adjustments if necessary. Discussed lifestyle modifications including Na restriction, low carb/fat diet, weight reduction and exercise (at least a walking program). ICD-10-CM ICD-9-CM    1. Essential hypertension  I10 401.9 CBC W/O DIFF      METABOLIC PANEL, COMPREHENSIVE   2. Mixed hyperlipidemia  E78.2 272.2 LIPID PANEL   3. Gastroesophageal reflux disease without esophagitis  K21.9 530.81    4. Statin intolerance  Z78.9 995.27    5. Agatston coronary artery calcium score less than 100  R93.1 793.2                I have reviewed with the patient details of the assessment and plan and all questions were answered. Relevent patient education was performed. Verbal and/or written instructions (see AVS) provided. The most recent lab findings were reviewed with the patient. Plan was discussed with patient who verbally expressed understanding. An After Visit Summary was printed and given to the patient.     Farrukh Glasgow MD

## 2021-05-12 NOTE — PATIENT INSTRUCTIONS
Hyperlipidemia: After Your Visit Your Care Instructions Hyperlipidemia is too much fat in your blood. The body has several kinds of fat, including cholesterol and triglycerides. Your body needs fat for many things, such as making new cells. But too much fat in your blood increases your chances of having a heart attack or stroke. You may be able to lower your cholesterol and triglycerides with a heart-healthy diet, exercise, and if needed, medicine. Your doctor may want you to try lifestyle changes first to see whether they lower the fat in your blood. You may need to take medicine if lifestyle changes do not lower the fat in your blood enough. Follow-up care is a key part of your treatment and safety. Be sure to make and go to all appointments, and call your doctor if you are having problems. Its also a good idea to know your test results and keep a list of the medicines you take. How can you care for yourself at home? Take your medicines · Take your medicines exactly as prescribed. Call your doctor if you think you are having a problem with your medicine. · If you take medicine to lower your cholesterol, go to follow-up visits. You will need to have blood tests. · Do not take large doses of niacin, which is a B vitamin, while taking medicine called statins. It may increase the chance of muscle pain and liver problems. · Talk to your doctor about avoiding grapefruit juice if you are taking statins. Grapefruit juice can raise the level of this medicine in your blood. This could increase side effects. Eat more fruits, vegetables, and fiber · Fruits and vegetables have lots of nutrients that help protect against heart disease, and they have littleif anyfat. Try to eat at least five servings a day. Dark green, deep orange, or yellow fruits and vegetables are healthy choices. · Keep carrots, celery, and other veggies handy for snacks.  Buy fruit that is in season and store it where you can see it so that you will be tempted to eat it. Cook dishes that have a lot of veggies in them, such as stir-fries and soups. · Foods high in fiber may reduce your cholesterol and provide important vitamins and minerals. High-fiber foods include whole-grain cereals and breads, oatmeal, beans, brown rice, citrus fruits, and apples. · Buy whole-grain breads and cereals instead of white bread and pastries. Limit saturated fat · Read food labels and try to avoid saturated fat and trans fat. They increase your risk of heart disease. · Use olive or canola oil when you cook. Try cholesterol-lowering spreads, such as Benecol or Take Control. · Bake, broil, grill, or steam foods instead of frying them. · Limit the amount of high-fat meats you eat, including hot dogs and sausages. Cut out all visible fat when you prepare meat. · Eat fish, skinless poultry, and soy products such as tofu instead of high-fat meats. Soybeans may be especially good for your heart. Eat at least two servings of fish a week. Certain fish, such as salmon, contain omega-3 fatty acids, which may help reduce your risk of heart attack. · Choose low-fat or fat-free milk and dairy products. Get exercise, limit alcohol, and quit smoking · Get more exercise. Work with your doctor to set up an exercise program. Even if you can do only a small amount, exercise will help you get stronger, have more energy, and manage your weight and your stress. Walking is an easy way to get exercise. Gradually increase the amount you walk every day. Aim for at least 30 minutes on most days of the week. You also may want to swim, bike, or do other activities. · Limit alcohol to no more than 2 drinks a day for men and 1 drink a day for women. · Do not smoke. If you need help quitting, talk to your doctor about stop-smoking programs and medicines. These can increase your chances of quitting for good. When should you call for help?  
Call 911 anytime you think you may need emergency care. For example, call if: 
· You have symptoms of a heart attack. These may include: ¨ Chest pain or pressure, or a strange feeling in the chest. 
¨ Sweating. ¨ Shortness of breath. ¨ Nausea or vomiting. ¨ Pain, pressure, or a strange feeling in the back, neck, jaw, or upper belly or in one or both shoulders or arms. ¨ Lightheadedness or sudden weakness. ¨ A fast or irregular heartbeat. After you call 911, the  may tell you to chew 1 adult-strength or 2 to 4 low-dose aspirin. Wait for an ambulance. Do not try to drive yourself. · You have signs of a stroke. These may include: 
¨ Sudden numbness, paralysis, or weakness in your face, arm, or leg, especially on only one side of your body. ¨ New problems with walking or balance. ¨ Sudden vision changes. ¨ Drooling or slurred speech. ¨ New problems speaking or understanding simple statements, or feeling confused. ¨ A sudden, severe headache that is different from past headaches. · You passed out (lost consciousness). Call your doctor now or seek immediate medical care if: 
· You have muscle pain or weakness. Watch closely for changes in your health, and be sure to contact your doctor if: 
· You are very tired. · You have an upset stomach, gas, constipation, or belly pain or cramps. Where can you learn more? Go to ZEEF.com.be Enter C406 in the search box to learn more about \"Hyperlipidemia: After Your Visit. \"  
© 6954-1408 [employer]wise, [employer]orporated. Care instructions adapted under license by [employer] (which disclaims liability or warranty for this information). This care instruction is for use with your licensed healthcare professional. If you have questions about a medical condition or this instruction, always ask your healthcare professional. [place] [address] any warranty or liability for your use of this information. Content Version: 0.1.974743; Last Revised: October 13, 2011

## 2021-05-13 ENCOUNTER — APPOINTMENT (OUTPATIENT)
Dept: INTERNAL MEDICINE CLINIC | Age: 61
End: 2021-05-13

## 2021-05-13 DIAGNOSIS — I10 ESSENTIAL HYPERTENSION: ICD-10-CM

## 2021-05-13 DIAGNOSIS — E78.2 MIXED HYPERLIPIDEMIA: ICD-10-CM

## 2021-05-13 LAB
ALBUMIN SERPL-MCNC: 4 G/DL (ref 3.5–5)
ALBUMIN/GLOB SERPL: 1.2 {RATIO} (ref 1.1–2.2)
ALP SERPL-CCNC: 91 U/L (ref 45–117)
ALT SERPL-CCNC: 13 U/L (ref 12–78)
ANION GAP SERPL CALC-SCNC: 6 MMOL/L (ref 5–15)
AST SERPL-CCNC: 12 U/L (ref 15–37)
BILIRUB SERPL-MCNC: 0.5 MG/DL (ref 0.2–1)
BUN SERPL-MCNC: 13 MG/DL (ref 6–20)
BUN/CREAT SERPL: 16 (ref 12–20)
CALCIUM SERPL-MCNC: 9.4 MG/DL (ref 8.5–10.1)
CHLORIDE SERPL-SCNC: 106 MMOL/L (ref 97–108)
CHOLEST SERPL-MCNC: 256 MG/DL
CO2 SERPL-SCNC: 28 MMOL/L (ref 21–32)
CREAT SERPL-MCNC: 0.83 MG/DL (ref 0.55–1.02)
ERYTHROCYTE [DISTWIDTH] IN BLOOD BY AUTOMATED COUNT: 13.5 % (ref 11.5–14.5)
GLOBULIN SER CALC-MCNC: 3.4 G/DL (ref 2–4)
GLUCOSE SERPL-MCNC: 90 MG/DL (ref 65–100)
HCT VFR BLD AUTO: 45.1 % (ref 35–47)
HDLC SERPL-MCNC: 70 MG/DL
HDLC SERPL: 3.7 {RATIO} (ref 0–5)
HGB BLD-MCNC: 13.8 G/DL (ref 11.5–16)
LDLC SERPL CALC-MCNC: 174.4 MG/DL (ref 0–100)
LIPID PROFILE,FLP: ABNORMAL
MCH RBC QN AUTO: 25.8 PG (ref 26–34)
MCHC RBC AUTO-ENTMCNC: 30.6 G/DL (ref 30–36.5)
MCV RBC AUTO: 84.3 FL (ref 80–99)
NRBC # BLD: 0 K/UL (ref 0–0.01)
NRBC BLD-RTO: 0 PER 100 WBC
PLATELET # BLD AUTO: 242 K/UL (ref 150–400)
PMV BLD AUTO: 12.2 FL (ref 8.9–12.9)
POTASSIUM SERPL-SCNC: 4.4 MMOL/L (ref 3.5–5.1)
PROT SERPL-MCNC: 7.4 G/DL (ref 6.4–8.2)
RBC # BLD AUTO: 5.35 M/UL (ref 3.8–5.2)
SODIUM SERPL-SCNC: 140 MMOL/L (ref 136–145)
TRIGL SERPL-MCNC: 58 MG/DL (ref ?–150)
VLDLC SERPL CALC-MCNC: 11.6 MG/DL
WBC # BLD AUTO: 4.3 K/UL (ref 3.6–11)

## 2021-05-14 ENCOUNTER — TELEPHONE (OUTPATIENT)
Dept: INTERNAL MEDICINE CLINIC | Age: 61
End: 2021-05-14

## 2021-05-14 DIAGNOSIS — E78.2 MIXED HYPERLIPIDEMIA: Primary | ICD-10-CM

## 2021-05-14 RX ORDER — ROSUVASTATIN CALCIUM 5 MG/1
5 TABLET, COATED ORAL
Qty: 90 TAB | Refills: 1 | Status: SHIPPED | OUTPATIENT
Start: 2021-05-14 | End: 2021-08-12

## 2021-05-14 NOTE — TELEPHONE ENCOUNTER
----- Message from Farrukh Glasgow MD sent at 5/14/2021 10:00 AM EDT -----  I know patient has been reluctant to initiate statin. Her calcium score on EBT heart scan was 11 which represent mild plaque buildup. LDL/bad cholesterol still high at 175. I recommend starting a statin. Please let me know if patient is interested and we can start with a low-dose.

## 2021-05-14 NOTE — TELEPHONE ENCOUNTER
MD Mike Castellanos LPN 57 minutes ago (3:23 PM)     I have sent in a prescription of the lowest dose of Crestor to [facility] pharmacy. Syeda Elder would like to start with 5 mg.  repeat a lipid panel 3-6 months.  Please make follow-up appointment with patient in 3 months.  We will check lab at that point.  fasting lipid    Message text      Patient scheduled for August 19th at 8am.

## 2021-05-14 NOTE — PROGRESS NOTES
I know patient has been reluctant to initiate statin. Her calcium score on EBT heart scan was 11 which represent mild plaque buildup. LDL/bad cholesterol still high at 175. I recommend starting a statin. Please let me know if patient is interested and we can start with a low-dose.

## 2021-06-03 ENCOUNTER — TELEPHONE (OUTPATIENT)
Dept: INTERNAL MEDICINE CLINIC | Age: 61
End: 2021-06-03

## 2021-06-03 NOTE — TELEPHONE ENCOUNTER
Patient called wanting to speak to someone about her crestor. She said she read something online about a side effect being that the medicine changes the color of urine. She would like to speak to someone about this.    CB: [phone]

## 2021-06-04 NOTE — TELEPHONE ENCOUNTER
Patient states she contacted the pharmacy and they indicated to her that this medication could cause a change in urine color. She states at night she has had some leg cramps they are not unbearable however taking the medication makes her a little uncomfortable and would like to has labs done in a week to see if is affecting her liver, kidneys or CPK levels. Please advise if when and what labs need to be done.

## 2021-06-04 NOTE — TELEPHONE ENCOUNTER
Kirt Gottron, MD Seymour Drape, LPN 3 hours ago (25:59 AM)   Julieta Leonard  I understand the concerns of the patient. We can check a Cmp, Ck level in a week    Message text      Called patient. She is on the phone with another office and states she will call me back.

## 2021-06-04 NOTE — TELEPHONE ENCOUNTER
Irineo Mitchell MD  You 15 hours ago (5:03 PM)   KV  I am not aware of change in urine color on people who take Crestor.  Yes they are research studies which have shown it can cause some myalgia, leg cramps.     Message text        Left message for patient to return call

## 2021-06-11 ENCOUNTER — LAB ONLY (OUTPATIENT)
Dept: INTERNAL MEDICINE CLINIC | Age: 61
End: 2021-06-11

## 2021-06-11 DIAGNOSIS — E78.2 MIXED HYPERLIPIDEMIA: Primary | ICD-10-CM

## 2021-06-11 LAB
ALBUMIN SERPL-MCNC: 4 G/DL (ref 3.5–5)
ALBUMIN/GLOB SERPL: 1.1 {RATIO} (ref 1.1–2.2)
ALP SERPL-CCNC: 86 U/L (ref 45–117)
ALT SERPL-CCNC: 16 U/L (ref 12–78)
ANION GAP SERPL CALC-SCNC: 6 MMOL/L (ref 5–15)
AST SERPL-CCNC: 13 U/L (ref 15–37)
BILIRUB SERPL-MCNC: 0.5 MG/DL (ref 0.2–1)
BUN SERPL-MCNC: 16 MG/DL (ref 6–20)
BUN/CREAT SERPL: 21 (ref 12–20)
CALCIUM SERPL-MCNC: 10 MG/DL (ref 8.5–10.1)
CHLORIDE SERPL-SCNC: 107 MMOL/L (ref 97–108)
CK SERPL-CCNC: 89 U/L (ref 26–192)
CO2 SERPL-SCNC: 27 MMOL/L (ref 21–32)
CREAT SERPL-MCNC: 0.78 MG/DL (ref 0.55–1.02)
GLOBULIN SER CALC-MCNC: 3.6 G/DL (ref 2–4)
GLUCOSE SERPL-MCNC: 89 MG/DL (ref 65–100)
POTASSIUM SERPL-SCNC: 4 MMOL/L (ref 3.5–5.1)
PROT SERPL-MCNC: 7.6 G/DL (ref 6.4–8.2)
SODIUM SERPL-SCNC: 140 MMOL/L (ref 136–145)

## 2021-06-18 RX ORDER — AMLODIPINE BESYLATE 10 MG/1
TABLET ORAL
Qty: 90 TABLET | Refills: 0 | Status: SHIPPED | OUTPATIENT
Start: 2021-06-18 | End: 2021-09-13

## 2021-09-13 RX ORDER — AMLODIPINE BESYLATE 10 MG/1
TABLET ORAL
Qty: 90 TABLET | Refills: 0 | Status: SHIPPED | OUTPATIENT
Start: 2021-09-13 | End: 2022-01-25 | Stop reason: SDUPTHER

## 2022-01-25 RX ORDER — AMLODIPINE BESYLATE 10 MG/1
10 TABLET ORAL DAILY
Qty: 90 TABLET | Refills: 0 | Status: SHIPPED | OUTPATIENT
Start: 2022-01-25

## 2022-01-25 NOTE — TELEPHONE ENCOUNTER
Last Refill: 09/13/21  Last Visit: 05/12/21  Next Visit: Visit date not found    Requested Prescriptions     Pending Prescriptions Disp Refills    amLODIPine (NORVASC) 10 mg tablet 90 Tablet 0     Sig: Take 1 Tablet by mouth daily.

## 2022-03-18 PROBLEM — M70.71 ISCHIAL BURSITIS OF RIGHT SIDE: Status: ACTIVE | Noted: 2018-07-26

## 2022-03-19 PROBLEM — M77.11 RIGHT LATERAL EPICONDYLITIS: Status: ACTIVE | Noted: 2018-07-26

## 2023-05-10 RX ORDER — IBUPROFEN 200 MG
200 TABLET ORAL
COMMUNITY

## 2023-05-10 RX ORDER — AMLODIPINE BESYLATE 10 MG/1
10 TABLET ORAL DAILY
COMMUNITY
Start: 2022-01-25

## 2023-05-10 RX ORDER — ESOMEPRAZOLE MAGNESIUM 40 MG/1
CAPSULE, DELAYED RELEASE ORAL
COMMUNITY
Start: 2021-04-27

## 2024-04-02 ENCOUNTER — OFFICE VISIT (OUTPATIENT)
Age: 64
End: 2024-04-02
Payer: COMMERCIAL

## 2024-04-02 VITALS
HEIGHT: 68 IN | OXYGEN SATURATION: 98 % | BODY MASS INDEX: 30.46 KG/M2 | HEART RATE: 86 BPM | WEIGHT: 201 LBS | SYSTOLIC BLOOD PRESSURE: 140 MMHG | DIASTOLIC BLOOD PRESSURE: 86 MMHG

## 2024-04-02 DIAGNOSIS — I25.10 ATHEROSCLEROSIS OF NATIVE CORONARY ARTERY OF NATIVE HEART WITHOUT ANGINA PECTORIS: ICD-10-CM

## 2024-04-02 DIAGNOSIS — I10 ESSENTIAL HYPERTENSION: ICD-10-CM

## 2024-04-02 DIAGNOSIS — E78.2 MIXED HYPERLIPIDEMIA: ICD-10-CM

## 2024-04-02 DIAGNOSIS — R07.89 ATYPICAL CHEST PAIN: Primary | ICD-10-CM

## 2024-04-02 DIAGNOSIS — R93.1 AGATSTON CAC SCORE, <100: ICD-10-CM

## 2024-04-02 DIAGNOSIS — I10 ESSENTIAL (PRIMARY) HYPERTENSION: ICD-10-CM

## 2024-04-02 PROCEDURE — 3079F DIAST BP 80-89 MM HG: CPT | Performed by: INTERNAL MEDICINE

## 2024-04-02 PROCEDURE — 93000 ELECTROCARDIOGRAM COMPLETE: CPT | Performed by: INTERNAL MEDICINE

## 2024-04-02 PROCEDURE — 3077F SYST BP >= 140 MM HG: CPT | Performed by: INTERNAL MEDICINE

## 2024-04-02 PROCEDURE — 99204 OFFICE O/P NEW MOD 45 MIN: CPT | Performed by: INTERNAL MEDICINE

## 2024-04-02 ASSESSMENT — PATIENT HEALTH QUESTIONNAIRE - PHQ9
SUM OF ALL RESPONSES TO PHQ QUESTIONS 1-9: 0
1. LITTLE INTEREST OR PLEASURE IN DOING THINGS: NOT AT ALL
SUM OF ALL RESPONSES TO PHQ QUESTIONS 1-9: 0
SUM OF ALL RESPONSES TO PHQ QUESTIONS 1-9: 0
2. FEELING DOWN, DEPRESSED OR HOPELESS: NOT AT ALL
SUM OF ALL RESPONSES TO PHQ QUESTIONS 1-9: 0
SUM OF ALL RESPONSES TO PHQ QUESTIONS 1-9: 0
SUM OF ALL RESPONSES TO PHQ9 QUESTIONS 1 & 2: 0
SUM OF ALL RESPONSES TO PHQ9 QUESTIONS 1 & 2: 0
2. FEELING DOWN, DEPRESSED OR HOPELESS: NOT AT ALL
SUM OF ALL RESPONSES TO PHQ QUESTIONS 1-9: 0
1. LITTLE INTEREST OR PLEASURE IN DOING THINGS: NOT AT ALL

## 2024-04-02 NOTE — PROGRESS NOTES
[address] [place] [address] [phone] [address], [place] [address]     Subjective:        Laura Sanchez is a 64 y.o. female is here to reestablish care, previously seen by me September 28, 2020.  On March 28, 2024 [facility] [place] with chest heaviness, and with previous evaluations at care now.  EKG and cardiac enzymes were normal.  The patient denies chest pain/ shortness of breath, orthopnea, PND, LE edema, palpitations, syncope, presyncope or fatigue.  She continues to do exercise regularly, a lot of strength training and starting to do some more cardio recently.  She works out from anywhere to 30 minutes to an hour, vigorously and feels good with that.    Patient Active Problem List    Diagnosis Date Noted    Ischial bursitis of right side 07/26/2018    Right lateral epicondylitis 07/26/2018    FH: colonic polyps 03/15/2016    Vitamin D deficiency 09/13/2011    Mixed hyperlipidemia 09/18/2009    GERD (gastroesophageal reflux disease) 09/18/2009    Essential hypertension 09/18/2009      Vivian Billy MD  Past Medical History:   Diagnosis Date    Advance directive discussed with patient 03/15/2016    Atypical chest pain     dr wilber horton [phone]    Back pain 03/2016    Colon polyp 11/03/2016    bx pending but report says 5 yr repeat    Dysphagia 2/24/14    notes from [facility] EGD planned    Elevated lipids     dr horton [phone]    Eustachian tube dysfunction 01/10/2017    notes from [facility]  4/27/17    Fibrocystic breast disease     Fractured tarsal bone 10/2013    pt first    GERD (gastroesophageal reflux disease)     Head ache 2/9/15    tension headache per Pt First note    Headache(784.0) 11/2009    dr aretha polo    Hypercholesterolemia     Hypertension     Incontinence of urine     Labyrinthitis 01/13/2017    PF Calvin note    Left knee pain 7/3013 6/[facility]/14 [facility]    PPD positive, treated

## 2024-04-02 NOTE — PATIENT INSTRUCTIONS
You will be scheduled for a routine stress test after your appointment today.    Please wear comfortable clothing (shorts or pants with a shirt or blouse) and walking/athletic shoes.    Do not eat or drink anything, except water, for at least 2 hours prior to your test.    Do take your scheduled medications prior to your test.    If your stress test looks okay, please schedule coronary calcium score    Follow up with Dr. Gomez in 1 year

## 2024-04-17 ENCOUNTER — TELEPHONE (OUTPATIENT)
Age: 64
End: 2024-04-17

## 2024-04-17 DIAGNOSIS — R07.89 ATYPICAL CHEST PAIN: ICD-10-CM

## 2024-04-17 DIAGNOSIS — I25.10 ATHEROSCLEROSIS OF NATIVE CORONARY ARTERY OF NATIVE HEART WITHOUT ANGINA PECTORIS: ICD-10-CM

## 2024-04-17 DIAGNOSIS — E78.2 MIXED HYPERLIPIDEMIA: Primary | ICD-10-CM

## 2024-04-17 RX ORDER — PRAVASTATIN SODIUM 40 MG
40 TABLET ORAL DAILY
Qty: 30 TABLET | Refills: 0
Start: 2024-04-17

## 2024-04-17 NOTE — TELEPHONE ENCOUNTER
Patient called back.  Spoke with patient , verified patient with two identifiers, regarding results and recommendations. Patient voiced understanding.     Patient stated taking Pravastatin 40 mg  every day per PCP.

## 2024-04-17 NOTE — TELEPHONE ENCOUNTER
----- Message from Jose Gomez MD sent at 4/16/2024  8:57 AM EDT -----  Please advise stress test showed no evidence of flow-limiting blockages in the arteries of the heart.  Proceed with healthy diet and exercise, follow-up as scheduled.  She can repeat her coronary calcium score if she wants (optional-you can place the order if she wants to do it), but noted that she is already taking a statin medication and it would be ideal to shoot for an LDL of about 70 or certainly less than 100.    Future Appointments  4/7/2025   10:20 AM   Jose Gomez MD [facility]     This nurse attempted to contact ms Sanchez. Message left to call us back.

## 2024-09-06 ENCOUNTER — TELEPHONE (OUTPATIENT)
Age: 64
End: 2024-09-06

## 2024-09-25 LAB — MAMMOGRAPHY, EXTERNAL: NORMAL

## 2024-10-11 LAB — PAP SMEAR, EXTERNAL: NORMAL

## 2024-11-15 ENCOUNTER — TELEPHONE (OUTPATIENT)
Age: 64
End: 2024-11-15

## 2024-11-15 NOTE — TELEPHONE ENCOUNTER
----- Message from Maisha CHAO sent at 11/15/2024  3:39 PM EST -----  Regarding: E[facility] Appointment Request  E[facility] Appointment Request    Patient needs appointment for E[facility] Appointment Type: New to Provider.    Patient Requested Dates(s): any day  Patient Requested Time: any time  Provider Name: preferred Dr Becky Truong    Reason for Appointment Request: Established Patient - No appointments available during search  --------------------------------------------------------------------------------------------------------------------------    Relationship to Patient: Self     Call Back Information: OK to leave message on voicemail  Preferred Call Back Number: Phone

## 2024-11-15 NOTE — TELEPHONE ENCOUNTER
Left pt a message to callback,she would like to establish care with .However  is completely booked thru next year,so she is currently not taking any New Pt's at this time.

## 2024-11-15 NOTE — TELEPHONE ENCOUNTER
----- Message from Yaneli BOURNE sent at 11/15/2024 12:18 PM EST -----  Regarding: FW: E[facility] Appointment Request    ----- Message -----  From: Tenzin Thomas  Sent: 11/15/2024   8:17 AM EST  To: [facility] Clinical Staff  Subject: E[facility] Appointment Request                          E[facility] Appointment Request    Patient needs appointment for E[facility] Appointment Type: New Patient.    Patient Requested Dates(s): First Available As soon as possible  Patient Requested Time: Anytime  Provider Name: Milady Ruiz MD    Reason for Appointment Request: New Patient - No appointments available during search  --------------------------------------------------------------------------------------------------------------------------    Relationship to Patient: Self     Call Back Information: OK to leave message on voicemail  Preferred Call Back Number: Phone [phone]

## 2025-01-24 SDOH — HEALTH STABILITY: PHYSICAL HEALTH: ON AVERAGE, HOW MANY DAYS PER WEEK DO YOU ENGAGE IN MODERATE TO STRENUOUS EXERCISE (LIKE A BRISK WALK)?: 4 DAYS

## 2025-01-24 SDOH — HEALTH STABILITY: PHYSICAL HEALTH: ON AVERAGE, HOW MANY MINUTES DO YOU ENGAGE IN EXERCISE AT THIS LEVEL?: 60 MIN

## 2025-01-27 ENCOUNTER — OFFICE VISIT (OUTPATIENT)
Facility: CLINIC | Age: 65
End: 2025-01-27
Payer: COMMERCIAL

## 2025-01-27 VITALS
OXYGEN SATURATION: 100 % | DIASTOLIC BLOOD PRESSURE: 76 MMHG | TEMPERATURE: 98 F | RESPIRATION RATE: 16 BRPM | WEIGHT: 198.6 LBS | BODY MASS INDEX: 30.1 KG/M2 | SYSTOLIC BLOOD PRESSURE: 122 MMHG | HEIGHT: 68 IN | HEART RATE: 103 BPM

## 2025-01-27 DIAGNOSIS — E66.811 OBESITY (BMI 30.0-34.9): ICD-10-CM

## 2025-01-27 DIAGNOSIS — I10 ESSENTIAL HYPERTENSION: Primary | ICD-10-CM

## 2025-01-27 DIAGNOSIS — E78.5 DYSLIPIDEMIA: ICD-10-CM

## 2025-01-27 DIAGNOSIS — Z00.00 PHYSICAL EXAM: ICD-10-CM

## 2025-01-27 DIAGNOSIS — R93.1 ELEVATED CORONARY ARTERY CALCIUM SCORE: ICD-10-CM

## 2025-01-27 DIAGNOSIS — K22.710 BARRETT'S ESOPHAGUS WITH LOW GRADE DYSPLASIA: ICD-10-CM

## 2025-01-27 DIAGNOSIS — M67.432 GANGLION CYST OF WRIST, LEFT: ICD-10-CM

## 2025-01-27 PROCEDURE — 3078F DIAST BP <80 MM HG: CPT | Performed by: INTERNAL MEDICINE

## 2025-01-27 PROCEDURE — 99386 PREV VISIT NEW AGE 40-64: CPT | Performed by: INTERNAL MEDICINE

## 2025-01-27 PROCEDURE — 3074F SYST BP LT 130 MM HG: CPT | Performed by: INTERNAL MEDICINE

## 2025-01-27 PROCEDURE — 99204 OFFICE O/P NEW MOD 45 MIN: CPT | Performed by: INTERNAL MEDICINE

## 2025-01-27 RX ORDER — FAMOTIDINE 20 MG
25 TABLET ORAL DAILY
COMMUNITY

## 2025-01-27 SDOH — ECONOMIC STABILITY: FOOD INSECURITY: WITHIN THE PAST 12 MONTHS, THE FOOD YOU BOUGHT JUST DIDN'T LAST AND YOU DIDN'T HAVE MONEY TO GET MORE.: NEVER TRUE

## 2025-01-27 SDOH — ECONOMIC STABILITY: FOOD INSECURITY: WITHIN THE PAST 12 MONTHS, YOU WORRIED THAT YOUR FOOD WOULD RUN OUT BEFORE YOU GOT MONEY TO BUY MORE.: NEVER TRUE

## 2025-01-27 ASSESSMENT — ANXIETY QUESTIONNAIRES
3. WORRYING TOO MUCH ABOUT DIFFERENT THINGS: NOT AT ALL
IF YOU CHECKED OFF ANY PROBLEMS ON THIS QUESTIONNAIRE, HOW DIFFICULT HAVE THESE PROBLEMS MADE IT FOR YOU TO DO YOUR WORK, TAKE CARE OF THINGS AT HOME, OR GET ALONG WITH OTHER PEOPLE: NOT DIFFICULT AT ALL
4. TROUBLE RELAXING: NOT AT ALL
5. BEING SO RESTLESS THAT IT IS HARD TO SIT STILL: NOT AT ALL
2. NOT BEING ABLE TO STOP OR CONTROL WORRYING: NOT AT ALL
1. FEELING NERVOUS, ANXIOUS, OR ON EDGE: NOT AT ALL
7. FEELING AFRAID AS IF SOMETHING AWFUL MIGHT HAPPEN: NOT AT ALL
GAD7 TOTAL SCORE: 0
6. BECOMING EASILY ANNOYED OR IRRITABLE: NOT AT ALL

## 2025-01-27 ASSESSMENT — PATIENT HEALTH QUESTIONNAIRE - PHQ9
1. LITTLE INTEREST OR PLEASURE IN DOING THINGS: NOT AT ALL
SUM OF ALL RESPONSES TO PHQ QUESTIONS 1-9: 0
2. FEELING DOWN, DEPRESSED OR HOPELESS: NOT AT ALL
SUM OF ALL RESPONSES TO PHQ QUESTIONS 1-9: 0
SUM OF ALL RESPONSES TO PHQ9 QUESTIONS 1 & 2: 0

## 2025-02-03 PROBLEM — K22.710 BARRETT'S ESOPHAGUS WITH LOW GRADE DYSPLASIA: Status: ACTIVE | Noted: 2025-02-03

## 2025-02-03 PROBLEM — E78.5 DYSLIPIDEMIA: Status: ACTIVE | Noted: 2025-02-03

## 2025-02-03 PROBLEM — E66.811 OBESITY (BMI 30.0-34.9): Status: ACTIVE | Noted: 2025-02-03

## 2025-02-03 PROBLEM — R93.1 ELEVATED CORONARY ARTERY CALCIUM SCORE: Status: ACTIVE | Noted: 2025-02-03

## 2025-02-03 PROBLEM — M67.432 GANGLION CYST OF WRIST, LEFT: Status: ACTIVE | Noted: 2025-02-03

## 2025-02-03 PROBLEM — K44.9 HIATAL HERNIA: Status: ACTIVE | Noted: 2025-02-03

## 2025-02-04 NOTE — PROGRESS NOTES
Chief Complaint   Patient presents with    New Patient     Establishing care      Patient states \" she is establishing care .\"      \"Have you been to the ER, urgent care clinic since your last visit?  Hospitalized since your last visit?\"    NO    “Have you seen or consulted any other health care providers outside our system since your last visit?”    NO    Have you had a mammogram?”   YES - Where: [facility] to request most recent records if not in the chart    Date of last Mammogram: 8/18/2020      “Have you had a pap smear?”    YES - [facility] to request most recent records if not in the chart    Date of last Cervical Cancer screen (HPV or PAP): 7/26/2019       “Have you had a colorectal cancer screening such as a colonoscopy/FIT/Cologuard?    YES - Type: Colonoscopy - Where: Dr. Jo Nurse/CMA to request most recent records if not in the chart     Date of last Colonoscopy: 11/3/2016  No cologuard on file  No FIT/FOBT on file   No flexible sigmoidoscopy on file          
medication from the third generation of statins. She is also encouraged to maintain a healthy diet and exercise routine to manage her cholesterol levels. Additional tests, including hs-CRP, APO B, LP (a), and hemoglobin A1c, will be ordered to further assess her cardiovascular risk. She is instructed to provide the results of her most recent lab work.    2. Hypertension.  She has been on amlodipine 10 mg consistently since 1985.    3. Osteoarthritis.  She has osteoarthritis in her left knee from a previous meniscus tear and some irritation in her right knee due to overcompensation. She is advised to manage her weight to reduce stress on her knees and to use ibuprofen sparingly for pain management.    4. Goiter.  She has a visible goiter but has had 2 ultrasounds confirming that everything is fine. A TSH test will be ordered to monitor her thyroid function.    5. Arreguin's esophagus.  She is under the care of her gastroenterologist, Dr. Jo and undergoes endoscopy every 3 years.    6. Suspected hernia.  She reported intermittent groin pain, which was suspected to be a hernia. The pain is not constant and has not been felt in the last week.    PROCEDURE  The patient underwent arthroscopic surgery on her left knee for a meniscus tear, ganglion cyst removal on her left wrist, and right ankle surgery following a fall down the stairs in 2008.    Return in about 4 months (around 5/27/2025).      Shakir Fong MD

## 2025-03-06 SDOH — HEALTH STABILITY: PHYSICAL HEALTH: ON AVERAGE, HOW MANY MINUTES DO YOU ENGAGE IN EXERCISE AT THIS LEVEL?: 50 MIN

## 2025-03-06 SDOH — HEALTH STABILITY: PHYSICAL HEALTH: ON AVERAGE, HOW MANY DAYS PER WEEK DO YOU ENGAGE IN MODERATE TO STRENUOUS EXERCISE (LIKE A BRISK WALK)?: 4 DAYS

## 2025-03-07 ENCOUNTER — OFFICE VISIT (OUTPATIENT)
Age: 65
End: 2025-03-07
Payer: COMMERCIAL

## 2025-03-07 VITALS
OXYGEN SATURATION: 98 % | BODY MASS INDEX: 29.01 KG/M2 | TEMPERATURE: 98 F | WEIGHT: 191.4 LBS | HEART RATE: 79 BPM | SYSTOLIC BLOOD PRESSURE: 122 MMHG | RESPIRATION RATE: 16 BRPM | HEIGHT: 68 IN | DIASTOLIC BLOOD PRESSURE: 77 MMHG

## 2025-03-07 DIAGNOSIS — R73.03 PREDIABETES: ICD-10-CM

## 2025-03-07 DIAGNOSIS — Z28.21 INFLUENZA VACCINATION DECLINED: ICD-10-CM

## 2025-03-07 DIAGNOSIS — Z28.21 PNEUMOCOCCAL VACCINATION DECLINED: ICD-10-CM

## 2025-03-07 DIAGNOSIS — I10 ESSENTIAL HYPERTENSION: ICD-10-CM

## 2025-03-07 DIAGNOSIS — E78.2 MIXED HYPERLIPIDEMIA: Primary | ICD-10-CM

## 2025-03-07 DIAGNOSIS — E04.9 GOITER: ICD-10-CM

## 2025-03-07 DIAGNOSIS — E55.9 VITAMIN D DEFICIENCY: ICD-10-CM

## 2025-03-07 PROCEDURE — 3074F SYST BP LT 130 MM HG: CPT | Performed by: FAMILY MEDICINE

## 2025-03-07 PROCEDURE — 3078F DIAST BP <80 MM HG: CPT | Performed by: FAMILY MEDICINE

## 2025-03-07 PROCEDURE — 99204 OFFICE O/P NEW MOD 45 MIN: CPT | Performed by: FAMILY MEDICINE

## 2025-03-07 SDOH — ECONOMIC STABILITY: FOOD INSECURITY: WITHIN THE PAST 12 MONTHS, THE FOOD YOU BOUGHT JUST DIDN'T LAST AND YOU DIDN'T HAVE MONEY TO GET MORE.: NEVER TRUE

## 2025-03-07 SDOH — ECONOMIC STABILITY: FOOD INSECURITY: WITHIN THE PAST 12 MONTHS, YOU WORRIED THAT YOUR FOOD WOULD RUN OUT BEFORE YOU GOT MONEY TO BUY MORE.: NEVER TRUE

## 2025-03-07 ASSESSMENT — PATIENT HEALTH QUESTIONNAIRE - PHQ9
SUM OF ALL RESPONSES TO PHQ QUESTIONS 1-9: 0
1. LITTLE INTEREST OR PLEASURE IN DOING THINGS: NOT AT ALL
SUM OF ALL RESPONSES TO PHQ QUESTIONS 1-9: 0
2. FEELING DOWN, DEPRESSED OR HOPELESS: NOT AT ALL
SUM OF ALL RESPONSES TO PHQ QUESTIONS 1-9: 0
SUM OF ALL RESPONSES TO PHQ QUESTIONS 1-9: 0

## 2025-03-07 ASSESSMENT — LIFESTYLE VARIABLES
HOW MANY STANDARD DRINKS CONTAINING ALCOHOL DO YOU HAVE ON A TYPICAL DAY: 1 OR 2
HOW OFTEN DO YOU HAVE A DRINK CONTAINING ALCOHOL: MONTHLY OR LESS

## 2025-03-07 NOTE — PROGRESS NOTES
Chief Complaint   Patient presents with    New Patient     No questions or concerns at this time      \"Have you been to the ER, urgent care clinic since your last visit?  Hospitalized since your last visit?\"    YES - When: approximately 1 months ago.  Where and Why: Urgent Care.    “Have you seen or consulted any other health care providers outside of [facility] since your last visit?”    NO    “Have you had a colorectal cancer screening such as a colonoscopy/FIT/Cologuard?    YES - Type: Colonoscopy - Where: [facility] Nurse/LORENE to request most recent records if not in the chart     Date of last Colonoscopy: 11/3/2016  No cologuard on file  No FIT/FOBT on file   No flexible sigmoidoscopy on file             
signature was used to authenticate this note.    --Yolanda Castano MD

## 2025-03-09 SDOH — HEALTH STABILITY: PHYSICAL HEALTH: ON AVERAGE, HOW MANY MINUTES DO YOU ENGAGE IN EXERCISE AT THIS LEVEL?: 50 MIN

## 2025-03-09 SDOH — HEALTH STABILITY: PHYSICAL HEALTH: ON AVERAGE, HOW MANY DAYS PER WEEK DO YOU ENGAGE IN MODERATE TO STRENUOUS EXERCISE (LIKE A BRISK WALK)?: 4 DAYS

## 2025-03-12 ENCOUNTER — OFFICE VISIT (OUTPATIENT)
Age: 65
End: 2025-03-12

## 2025-03-12 VITALS
BODY MASS INDEX: 29.37 KG/M2 | WEIGHT: 193.8 LBS | DIASTOLIC BLOOD PRESSURE: 78 MMHG | TEMPERATURE: 97.7 F | SYSTOLIC BLOOD PRESSURE: 133 MMHG | RESPIRATION RATE: 18 BRPM | HEIGHT: 68 IN | OXYGEN SATURATION: 100 % | HEART RATE: 83 BPM

## 2025-03-12 DIAGNOSIS — E78.2 MIXED HYPERLIPIDEMIA: Primary | ICD-10-CM

## 2025-03-12 DIAGNOSIS — Z76.89 ENCOUNTER TO ESTABLISH CARE WITH NEW DOCTOR: ICD-10-CM

## 2025-03-12 NOTE — PROGRESS NOTES
SUBJECTIVE:   Ms. Laura Sanchez is a 65 y.o. female who is here as a new patient establishing care.    Pt hasn't had a PCP in the last 5 years. She has been looking for one and she has seen two this year. She had a physical on 01/2025. Pt has a health center at work where she has been getting lab tests. The 2nd PCP she has seen ordered lab work which included, Hgb A1c, CBC, lipid panel, and TSH levels. Pt was prescribed pravastatin in 10/2024, but pt admits that she hasn't been taking this medication.    Pt does exercise regularly. She has mainly focused on resistance training, but has been implementing more cardio into her regimen. Pt mentions issues with sleep. She states falling asleep isn't hard, but has problems staying asleep. Pt states she is taking magnesium glycinate.    Pt has osteoarthritis in her knees. She had a meniscus tear in her L. Knee. Pt is followed by Dr. Cisse (orthopedics).    Pt is seen by Dr. Zach Roberto (neurology) for migraines in the past.    Pt is followed by Urogynecology for urinary incontinence. She states she can't hold her urine as well as in the past.    Pt is followed by Dr. Gomez (cardiology)    Pt has a history of mild Barretts. Pt has had upper GI Pt also has a hiatal hernia. Pt has a goiter. Pt  had thyroid ultrasound in 2022 and everything was stable.      Pt has a family history of pancreatic cancer. Pt mother had mild dementia. No heart disease or autoimmune diseases. Pt maternal family history of diabetes. Pt mother has a history of polyps.    PREVENTATIVE:  [facility] UTD 10/2024  Colonoscopy UTD every 5 years last 2021  Shingles vaccines.  Flu due  Pneumonia due      At this time, she is otherwise doing well and has brought no other complaints to my attention today.  For a list of the medical issues addressed today, see the assessment and plan below.    PMH:   Past Medical History:   Diagnosis Date    Advance directive discussed with patient 03/15/2016    Atypical

## 2025-03-12 NOTE — PROGRESS NOTES
\"Have you been to the ER, urgent care clinic since your last visit?  Hospitalized since your last visit?\"    Urgent Care/ Covid 1 month ago    “Have you seen or consulted any other health care providers outside our system since your last visit?”    NO      “Have you had a colorectal cancer screening such as a colonoscopy/FIT/Cologuard?    November 2021/ Dr Jo    Date of last Colonoscopy: 11/3/2016  No cologuard on file  No FIT/FOBT on file   No flexible sigmoidoscopy on file

## 2025-03-27 LAB — HBA1C MFR BLD HPLC: 5.6 %

## 2025-04-07 ENCOUNTER — OFFICE VISIT (OUTPATIENT)
Age: 65
End: 2025-04-07
Payer: COMMERCIAL

## 2025-04-07 VITALS
WEIGHT: 193 LBS | DIASTOLIC BLOOD PRESSURE: 72 MMHG | SYSTOLIC BLOOD PRESSURE: 138 MMHG | BODY MASS INDEX: 29.25 KG/M2 | OXYGEN SATURATION: 98 % | HEIGHT: 68 IN | HEART RATE: 82 BPM

## 2025-04-07 DIAGNOSIS — E78.2 MIXED HYPERLIPIDEMIA: ICD-10-CM

## 2025-04-07 DIAGNOSIS — I10 ESSENTIAL (PRIMARY) HYPERTENSION: ICD-10-CM

## 2025-04-07 DIAGNOSIS — I10 ESSENTIAL HYPERTENSION: ICD-10-CM

## 2025-04-07 DIAGNOSIS — R07.89 ATYPICAL CHEST PAIN: Primary | ICD-10-CM

## 2025-04-07 DIAGNOSIS — R93.1 AGATSTON CAC SCORE, <100: ICD-10-CM

## 2025-04-07 DIAGNOSIS — I25.10 ATHEROSCLEROSIS OF NATIVE CORONARY ARTERY OF NATIVE HEART WITHOUT ANGINA PECTORIS: ICD-10-CM

## 2025-04-07 PROCEDURE — G2211 COMPLEX E/M VISIT ADD ON: HCPCS | Performed by: INTERNAL MEDICINE

## 2025-04-07 PROCEDURE — 99214 OFFICE O/P EST MOD 30 MIN: CPT | Performed by: INTERNAL MEDICINE

## 2025-04-07 PROCEDURE — 3078F DIAST BP <80 MM HG: CPT | Performed by: INTERNAL MEDICINE

## 2025-04-07 PROCEDURE — 3075F SYST BP GE 130 - 139MM HG: CPT | Performed by: INTERNAL MEDICINE

## 2025-04-07 PROCEDURE — 93000 ELECTROCARDIOGRAM COMPLETE: CPT | Performed by: INTERNAL MEDICINE

## 2025-04-07 ASSESSMENT — PATIENT HEALTH QUESTIONNAIRE - PHQ9
2. FEELING DOWN, DEPRESSED OR HOPELESS: NOT AT ALL
1. LITTLE INTEREST OR PLEASURE IN DOING THINGS: NOT AT ALL
SUM OF ALL RESPONSES TO PHQ QUESTIONS 1-9: 0

## 2025-04-07 NOTE — PROGRESS NOTES
1. Have you been to the ER, urgent care clinic since your last visit?  Hospitalized since your last visit?  Urgent care visit for COVID in February       2. Have you seen or consulted any other health care providers outside of the [facility] since your last visit?  Include any pap smears or colon screening.   [facility] Dr. Jha   
October 2020 normal without significant mitral regurgitation         HTN  Control   Continue current care      HLD  -LDL from her workplace reviewed from July 2024-, HDL 71, total cholesterol 245-patient states she was not really taking her statin medication regularly at this time-states she is now take  -Labs from her employer reviewed February 21, 2024-total cholesterol 284 HDL 83 triglycerides 62 , labs from January 22, 2024 reviewed, comprehensive metabolic panel essentially within normal limits with exception of potassium slightly low at 3.3, creatinine normal at 0.6, CBC within normal limits.  -Patient has upcoming visit with her PCP who checked labs more recently-she we will check if LDL is less than 70 and consider Crestor if not  Recall: Intolerant of atorvastatin with myalgia       Counseled on diet and exercise- eventual goal of 30-60 minutes 5-7 times a week as per AHA guidelines.  She is exceeding expectations on this.  Commendation of strength training and car     Follow up in 1 year, sooner as needed.     Please note that my services today are part of ongoing care related to this patient's serious and complex cardiac conditions, including all of the above cardiovascular diagnoses.        Jose Gomez MD

## 2025-04-08 ENCOUNTER — TELEPHONE (OUTPATIENT)
Age: 65
End: 2025-04-08

## 2025-04-08 NOTE — TELEPHONE ENCOUNTER
[facility] needs to know if we received lab results from [employer]?  Lipo protein & A1C?    Please call Sara to let her know. Thanks.

## 2025-04-28 ENCOUNTER — OFFICE VISIT (OUTPATIENT)
Age: 65
End: 2025-04-28
Payer: COMMERCIAL

## 2025-04-28 VITALS
OXYGEN SATURATION: 100 % | HEART RATE: 84 BPM | TEMPERATURE: 96.8 F | DIASTOLIC BLOOD PRESSURE: 76 MMHG | WEIGHT: 198 LBS | BODY MASS INDEX: 30.01 KG/M2 | RESPIRATION RATE: 16 BRPM | SYSTOLIC BLOOD PRESSURE: 145 MMHG | HEIGHT: 68 IN

## 2025-04-28 DIAGNOSIS — R10.31 RIGHT LOWER QUADRANT ABDOMINAL PAIN: Primary | ICD-10-CM

## 2025-04-28 PROCEDURE — 99214 OFFICE O/P EST MOD 30 MIN: CPT | Performed by: FAMILY MEDICINE

## 2025-04-28 PROCEDURE — 3078F DIAST BP <80 MM HG: CPT | Performed by: FAMILY MEDICINE

## 2025-04-28 PROCEDURE — 3077F SYST BP >= 140 MM HG: CPT | Performed by: FAMILY MEDICINE

## 2025-04-28 ASSESSMENT — PATIENT HEALTH QUESTIONNAIRE - PHQ9
SUM OF ALL RESPONSES TO PHQ QUESTIONS 1-9: 0
SUM OF ALL RESPONSES TO PHQ QUESTIONS 1-9: 0
2. FEELING DOWN, DEPRESSED OR HOPELESS: NOT AT ALL
1. LITTLE INTEREST OR PLEASURE IN DOING THINGS: NOT AT ALL
SUM OF ALL RESPONSES TO PHQ QUESTIONS 1-9: 0
SUM OF ALL RESPONSES TO PHQ QUESTIONS 1-9: 0

## 2025-04-28 NOTE — PROGRESS NOTES
Have you been to the ER, urgent care clinic since your last visit?  Hospitalized since your last visit?   NO    Have you seen or consulted any other health care providers outside our system since your last visit?   NO      “Have you had a colorectal cancer screening such as a colonoscopy/FIT/Cologuard?    YES - Type: Colonoscopy - Where: Approx 4 years ago.  Nurse/LORENE to request most recent records if not in the chart     Date of last Colonoscopy: 11/3/2016  No cologuard on file  No FIT/FOBT on file   No flexible sigmoidoscopy on file          
01/10/2017    notes from ENT giodano  4/27/17    Fibrocystic breast disease     Fractured tarsal bone 10/2013    pt first    GERD (gastroesophageal reflux disease)     Head ache 2/9/15    tension headache per Pt First note    Headache(784.0) 11/2009    dr aretha polo    Hypercholesterolemia     Hypertension     Incontinence of urine     Labyrinthitis 01/13/2017    PF [facility] note    Left knee pain 7/3013    6/4/14 notes from [facility]    Osteoarthritis 2014    PPD positive, treated     INH but not sure length of tx    Screening for viral disease 12/3/15    titers neg for Hep A-B and C thru GYN    Tension type headache     Viral gastroenteritis [id number]    heraclio notes     PSH:  has a past surgical history that includes orthopedic surgery (9/2010); Colonoscopy (11/03/2016); other surgical history (3/17/14); colonoscopy flx dx w/collj spec when pfrmd (10/3/2011); Upper gastrointestinal endoscopy (3/2002); CT CARDIAC CALCIUM SCORING (3/2006); orthopedic surgery (1/18/10); and knee surgery (2010).    All: is allergic to lipitor [atorvastatin] and iodine.   MEDS:   Current Outpatient Medications   Medication Sig    CREATINE PO Take by mouth daily    Coenzyme Q10 (CO Q 10 PO) Co Q 10   PRILLAMAN KEO    Collagen-Vitamin C-Biotin (COLLAGEN) 500-50-0.8 MG CAPS COLLAGEN   PRILLAMAN KEO    Omega-3 Fatty Acids (OMEGA-3 PO)     vitamin D (VITAMIN D, CHOLECALCIFEROL,) 25 MCG (1000 UT) CAPS Take 25 capsules by mouth daily    pravastatin (PRAVACHOL) 40 MG tablet Take 1 tablet by mouth daily    amLODIPine (NORVASC) 10 MG tablet Take 1 tablet by mouth daily    esomeprazole (NEXIUM) 40 MG delayed release capsule as needed ceived the following from [facility]: Outside name: esomeprazole (NEXIUM) 40 mg capsule    ibuprofen (ADVIL;MOTRIN) 200 MG tablet Take 1 tablet by mouth as needed    Phytosterol Esters (CHOLEST CARE PO) Cholest Care   PRILLAMAN KEO Status: Inactive (Patient not taking: Reported on

## 2025-05-02 ENCOUNTER — HOSPITAL ENCOUNTER (OUTPATIENT)
Facility: HOSPITAL | Age: 65
End: 2025-05-02
Attending: FAMILY MEDICINE
Payer: COMMERCIAL

## 2025-05-02 ENCOUNTER — HOSPITAL ENCOUNTER (OUTPATIENT)
Facility: HOSPITAL | Age: 65
Discharge: HOME | End: 2025-05-02
Attending: FAMILY MEDICINE
Payer: COMMERCIAL

## 2025-05-02 ENCOUNTER — LAB (OUTPATIENT)
Age: 65
End: 2025-05-02

## 2025-05-02 DIAGNOSIS — R10.31 RIGHT LOWER QUADRANT PAIN: ICD-10-CM

## 2025-05-02 DIAGNOSIS — E78.2 MIXED HYPERLIPIDEMIA: ICD-10-CM

## 2025-05-02 DIAGNOSIS — R10.31 RIGHT LOWER QUADRANT ABDOMINAL PAIN: ICD-10-CM

## 2025-05-02 PROCEDURE — 76830 TRANSVAGINAL US NON-OB: CPT

## 2025-05-02 PROCEDURE — 76856 US EXAM PELVIC COMPLETE: CPT

## 2025-05-03 LAB
CHOLEST SERPL-MCNC: 251 MG/DL (ref 100–199)
HDL SERPL-SCNC: 33.8 UMOL/L
HDLC SERPL-MCNC: 79 MG/DL
LDL SERPL QN: 21.8 NM
LDL SERPL-SCNC: 1446 NMOL/L
LDL SMALL SERPL-SCNC: 229 NMOL/L
LDLC SERPL CALC-MCNC: 163 MG/DL (ref 0–99)
LP-IR SCORE SERPL: <25
TRIGL SERPL-MCNC: 55 MG/DL (ref 0–149)

## 2025-05-04 ENCOUNTER — RESULTS FOLLOW-UP (OUTPATIENT)
Age: 65
End: 2025-05-04

## 2025-05-08 ENCOUNTER — OFFICE VISIT (OUTPATIENT)
Age: 65
End: 2025-05-08
Payer: COMMERCIAL

## 2025-05-08 VITALS
HEART RATE: 88 BPM | BODY MASS INDEX: 29.8 KG/M2 | HEIGHT: 68 IN | OXYGEN SATURATION: 99 % | RESPIRATION RATE: 18 BRPM | TEMPERATURE: 98.2 F | WEIGHT: 196.6 LBS | SYSTOLIC BLOOD PRESSURE: 140 MMHG | DIASTOLIC BLOOD PRESSURE: 70 MMHG

## 2025-05-08 DIAGNOSIS — R10.31 RIGHT LOWER QUADRANT ABDOMINAL PAIN: Primary | ICD-10-CM

## 2025-05-08 DIAGNOSIS — Z71.2 ENCOUNTER TO DISCUSS TEST RESULTS: ICD-10-CM

## 2025-05-08 PROCEDURE — 99213 OFFICE O/P EST LOW 20 MIN: CPT | Performed by: FAMILY MEDICINE

## 2025-05-08 PROCEDURE — 3078F DIAST BP <80 MM HG: CPT | Performed by: FAMILY MEDICINE

## 2025-05-08 PROCEDURE — 3077F SYST BP >= 140 MM HG: CPT | Performed by: FAMILY MEDICINE

## 2025-05-08 NOTE — PROGRESS NOTES
Have you been to the ER, urgent care clinic since your last visit?  Hospitalized since your last visit?   NO    Have you seen or consulted any other health care providers outside our system since your last visit?   NO      “Have you had a colorectal cancer screening such as a colonoscopy/FIT/Cologuard?    Not due/ Dr. Jo    Date of last Colonoscopy: 11/3/2016  No cologuard on file  No FIT/FOBT on file   No flexible sigmoidoscopy on file

## 2025-05-08 NOTE — PROGRESS NOTES
SUBJECTIVE:   Ms. Laura Sanchez is a 65 y.o. female who is here for follow up of routine medical issues.          Pt presents today to discuss results. Pt states she had been having some discomfort in her abdomen. She was seen in the office on 04/28/2025. After examination she still had some discomfort so she took an ibuprofen.  Pt BP was elevated in office 150/73 pt admits to feeling nervous about discussing her results today.      At this time, she is otherwise doing well and has brought no other complaints to my attention today.  For a list of the medical issues addressed today, see the assessment and plan below.    PMH:   Past Medical History:   Diagnosis Date    Advance directive discussed with patient 03/15/2016    Atypical chest pain     dr wilber horton [phone]    Back pain 03/2016    Colon polyp 11/03/2016    bx pending but report says 5 yr repeat    Dysphagia 2/24/14    notes from carolyn pulliam EGD planned    Elevated lipids     dr horton [phone]    Eustachian tube dysfunction 01/10/2017    notes from ENT giodano  4/27/17    Fibrocystic breast disease     Fractured tarsal bone 10/2013    pt first    GERD (gastroesophageal reflux disease)     Head ache 2/9/15    tension headache per Pt First note    Headache(784.0) 11/2009    dr aretha polo    Hypercholesterolemia     Hypertension     Incontinence of urine     Labyrinthitis 01/13/2017     [facility] note    Left knee pain 7/3013 6/4/14 notes from [facility]    Osteoarthritis 2014    PPD positive, treated     INH but not sure length of tx    Screening for viral disease 12/3/15    titers neg for Hep A-B and C thru GYN    Tension type headache     Viral gastroenteritis [id number]    heraclio notes     PSH:  has a past surgical history that includes orthopedic surgery (9/2010); Colonoscopy (11/03/2016); other surgical history (3/17/14); colonoscopy flx dx w/collj spec when pfrmd (10/3/2011); Upper gastrointestinal endoscopy (3/2002); CT CARDIAC CALCIUM

## 2025-05-09 ENCOUNTER — TELEPHONE (OUTPATIENT)
Age: 65
End: 2025-05-09

## 2025-05-09 ENCOUNTER — RESULTS FOLLOW-UP (OUTPATIENT)
Age: 65
End: 2025-05-09

## 2025-07-11 RX ORDER — AMLODIPINE BESYLATE 10 MG/1
10 TABLET ORAL DAILY
Qty: 90 TABLET | Refills: 1 | Status: SHIPPED | OUTPATIENT
Start: 2025-07-11

## 2025-07-11 NOTE — TELEPHONE ENCOUNTER
Caller requests Refill of:  amLODIPine (NORVASC) 10 MG tablet     Please send to:    [facility]/pharmacy #[id number] - [place], [place] - [address] - P [phone] - F [phone]  [address] [address]  Phone: [phone] Fax: [phone]         Visit / Appointment History:  Future Appointment at [facility]:  Visit date not found   Last Appointment With PCP:  5/8/2025       Caller confirmed instructions and dosages as correct.    Caller was advised that Meds will be refilled as soon as possible, however there can be a 48-72 business hour turn around on refill requests.

## 2025-09-04 ENCOUNTER — TELEPHONE (OUTPATIENT)
Age: 65
End: 2025-09-04